# Patient Record
Sex: MALE | Race: BLACK OR AFRICAN AMERICAN | Employment: OTHER | URBAN - METROPOLITAN AREA
[De-identification: names, ages, dates, MRNs, and addresses within clinical notes are randomized per-mention and may not be internally consistent; named-entity substitution may affect disease eponyms.]

---

## 2019-11-09 ENCOUNTER — HOSPITAL ENCOUNTER (INPATIENT)
Facility: HOSPITAL | Age: 69
LOS: 9 days | Discharge: HOME HEALTH | DRG: 235 | End: 2019-11-18
Attending: INTERNAL MEDICINE | Admitting: INTERNAL MEDICINE
Payer: MEDICARE

## 2019-11-09 DIAGNOSIS — I25.119 ATHEROSCLEROSIS OF NATIVE CORONARY ARTERY OF NATIVE HEART WITH ANGINA PECTORIS (HCC): ICD-10-CM

## 2019-11-09 PROBLEM — I24.9 ACS (ACUTE CORONARY SYNDROME) (HCC): Status: ACTIVE | Noted: 2019-11-09

## 2019-11-09 PROCEDURE — 99221 1ST HOSP IP/OBS SF/LOW 40: CPT | Performed by: INTERNAL MEDICINE

## 2019-11-09 RX ORDER — CARVEDILOL 12.5 MG/1
12.5 TABLET ORAL 2 TIMES DAILY
Status: DISCONTINUED | OUTPATIENT
Start: 2019-11-09 | End: 2019-11-10

## 2019-11-09 RX ORDER — GLIPIZIDE 10 MG/1
10 TABLET ORAL
COMMUNITY

## 2019-11-09 RX ORDER — HEPARIN SODIUM 5000 [USP'U]/ML
5000 INJECTION, SOLUTION INTRAVENOUS; SUBCUTANEOUS EVERY 8 HOURS SCHEDULED
Status: DISCONTINUED | OUTPATIENT
Start: 2019-11-09 | End: 2019-11-12

## 2019-11-09 RX ORDER — AMLODIPINE BESYLATE 10 MG/1
10 TABLET ORAL DAILY
Status: DISCONTINUED | OUTPATIENT
Start: 2019-11-10 | End: 2019-11-18

## 2019-11-09 RX ORDER — ATORVASTATIN CALCIUM 40 MG/1
40 TABLET, FILM COATED ORAL DAILY
Status: DISCONTINUED | OUTPATIENT
Start: 2019-11-10 | End: 2019-11-09

## 2019-11-09 RX ORDER — ISOSORBIDE MONONITRATE 30 MG/1
30 TABLET, EXTENDED RELEASE ORAL DAILY
Status: DISCONTINUED | OUTPATIENT
Start: 2019-11-09 | End: 2019-11-11

## 2019-11-09 RX ORDER — ATORVASTATIN CALCIUM 40 MG/1
40 TABLET, FILM COATED ORAL DAILY
COMMUNITY

## 2019-11-09 RX ORDER — ATORVASTATIN CALCIUM 80 MG/1
80 TABLET, FILM COATED ORAL DAILY
Status: DISCONTINUED | OUTPATIENT
Start: 2019-11-10 | End: 2019-11-13

## 2019-11-09 RX ORDER — FLUTICASONE PROPIONATE 50 MCG
1 SPRAY, SUSPENSION (ML) NASAL DAILY
Status: DISCONTINUED | OUTPATIENT
Start: 2019-11-09 | End: 2019-11-18

## 2019-11-09 RX ORDER — CARVEDILOL 12.5 MG/1
12.5 TABLET ORAL 2 TIMES DAILY
COMMUNITY

## 2019-11-09 RX ORDER — ASPIRIN 81 MG/1
81 TABLET ORAL DAILY
Status: DISCONTINUED | OUTPATIENT
Start: 2019-11-10 | End: 2019-11-11

## 2019-11-09 RX ORDER — SODIUM CHLORIDE 0.9 % (FLUSH) 0.9 %
3 SYRINGE (ML) INJECTION AS NEEDED
Status: DISCONTINUED | OUTPATIENT
Start: 2019-11-09 | End: 2019-11-18

## 2019-11-09 RX ORDER — ACETAMINOPHEN 325 MG/1
650 TABLET ORAL EVERY 6 HOURS PRN
Status: DISCONTINUED | OUTPATIENT
Start: 2019-11-09 | End: 2019-11-12

## 2019-11-09 RX ORDER — AMLODIPINE BESYLATE 10 MG/1
10 TABLET ORAL DAILY
COMMUNITY

## 2019-11-09 NOTE — PROGRESS NOTES
Patient transferred from [facility]. Family at the bedside. Med rec completed. Tele monitoring ordered. Dr. Mcdermott Brothvika notified. Waiting for admit orders. Pt A&Ox4, /85, HR 87, 98 % RA. Pt ambulates in room independently. ACHS.  Records from [facility]

## 2019-11-10 PROCEDURE — 99233 SBSQ HOSP IP/OBS HIGH 50: CPT | Performed by: HOSPITALIST

## 2019-11-10 RX ORDER — CARVEDILOL 25 MG/1
25 TABLET ORAL 2 TIMES DAILY
Status: DISCONTINUED | OUTPATIENT
Start: 2019-11-10 | End: 2019-11-13

## 2019-11-10 RX ORDER — DEXTROSE MONOHYDRATE 25 G/50ML
50 INJECTION, SOLUTION INTRAVENOUS AS NEEDED
Status: DISCONTINUED | OUTPATIENT
Start: 2019-11-10 | End: 2019-11-18

## 2019-11-10 NOTE — PLAN OF CARE
Problem: Diabetes/Glucose Control  Goal: Glucose maintained within prescribed range  Description  INTERVENTIONS:  - Monitor Blood Glucose as ordered  - Assess for signs and symptoms of hyperglycemia and hypoglycemia  - Administer ordered medications to m and administer replacement therapy as ordered  Outcome: Progressing     Problem: Patient Centered Care  Goal: Patient preferences are identified and integrated in the patient's plan of care  Description  Interventions:  - What would you like us to know as

## 2019-11-10 NOTE — CONSULTS
[facility] HOSP - [facility]    Report of Consultation    Brayan Ortiz Patient Status:  Inpatient    1/15/1950 MRN [id number]   Location [facility] 3W/SW Attending Brunilda Andres MD   Hosp Day # 0 PCP No primary care provider on file.      Date of PRN  Fluticasone Propionate (FLONASE) 50 MCG/ACT nasal spray 1 spray, 1 spray, Each Nare, Daily      amLODIPine Besylate (NORVASC) 10 MG Oral Tab, Take 10 mg by mouth daily. aspirin 81 MG Oral Tab, Take 81 mg by mouth daily.   atorvastatin 40 MG Oral Tab, TSH, TSHREFLEX, ARUN, LIP, GGT, PSA, DDIMER, ESRML, ESRPF, CRP, BNP, MG, PHOS, TROP, CK, CKMB, ADELA, RPR, B12, ETOH, POCGLU      Imaging:     no imaging available for now      Impression/Recommendations:       Unstable angina with possible NSTEMI, I ll revie

## 2019-11-10 NOTE — CONSULTS
[facility]    Report of Consultation    Ira Brown Patient Status:  Inpatient    1/15/1950 MRN [id number]   Location [facility] 3W/SW Attending Venkat Maria MD   Hosp Day # 1 PCP No primary care provider on file.      Date of A Daily  Normal Saline Flush 0.9 % injection 3 mL, 3 mL, Intravenous, PRN  Heparin Sodium (Porcine) 5000 UNIT/ML injection 5,000 Units, 5,000 Units, Subcutaneous, Q8H [facility]  acetaminophen (TYLENOL) tab 650 mg, 650 mg, Oral, Q6H PRN  Fluticasone Propionate (SHAMEKA MCH 29.9   MCHC 33.0   RDW 13.5   NEPRELIM 4.11   WBC 7.1   .0         Recent Labs   Lab 11/10/19  0550   *   BUN 26*   CREATSERUM 1.68*   GFRAA 47*   GFRNAA 41*   CA 9.6      K 4.2      CO2 25.0        Imaging:                I

## 2019-11-10 NOTE — H&P
[address] Patient Status:  Inpatient    1/15/1950 MRN [id number]   Location [facility] 3W/SW Attending Elvis Neil MD   Hosp Day # 0 PCP No primary care provider on file.      Date:   Unknown time  Yes Yes   Sig: Take 12.5 mg by mouth 2 (two) times daily.  Pt takes 9am and 9 pm   glipiZIDE 10 MG Oral Tab 11/9/2019 at Unknown time  Yes Yes   Sig: Take 10 mg by mouth every morning before breakfast.      Facility-Administered Medications: N hypertension and diabetes presented to [facility] for an NSTEMI underwent angiogram which showed triple-vessel disease needing CABG. Patient was transferred to [facility] [place] [facility] due to insurance purposes.     CAD presented with an NSTEMI  Cardiac ca

## 2019-11-10 NOTE — PROGRESS NOTES
[facility] H[facility] - [facility]  Hospitalist Progress Note     Hal Poag Patient Status:  Inpatient    1/15/1950  78year old CSN [id number]   Location 305/305-A Attending Wes Schwartz MD   Hosp Day # 1 PCP No primary care provider on file.      ASSESSMEN 11/10/19  0550   *   BUN 26*   CREATSERUM 1.68*   GFRAA 47*   GFRNAA 41*   CA 9.6   ALB 3.3*      K 4.2      CO2 25.0   ALKPHO 57   AST 39*   ALT 43   BILT 0.4   TP 7.1     Recent Labs   Lab 11/10/19  0550   INR 1.11       • carvedilol

## 2019-11-10 NOTE — PROGRESS NOTES
Cardiology progress note    24 h event patient feels ok, CT surgery consulted.          Current Medications:  dextrose 50 % injection 50 mL, 50 mL, Intravenous, PRN  Glucose-Vitamin C (DEX-4) chewable tab 4 tablet, 4 tablet, Oral, Q15 Min PRN  glucose (DEX4 sounds normal; no masses,  no organomegaly  Extremities: extremities normal, atraumatic, no cyanosis or edema    Results:     Laboratory Data:  Lab Results   Component Value Date    WBC 7.1 11/10/2019    HGB 12.1 (L) 11/10/2019    HCT 36.7 (L) 11/10/2019 motion of the   inferior vena cava.     Left Atrium:       Mild left atrial enlargement.     Mitral Valve:      Calcified mitral annulus.                         Non rheumatic thickening of mitral valve is present.                        Normal mobility of the care of your patient. please call if you have any question.      Sanjuana Madden MD   General Cardiology & Advanced Heart Failure, Cardiac Transplant and Assisted Devices  [facility]  Pager: [phone]  Tel: [phone]

## 2019-11-10 NOTE — PROGRESS NOTES
Therapeutic interchange from Veramyst to Flonase per P&T approved protocol.      Thank you,  Radha Valencia, PharmD

## 2019-11-10 NOTE — PROGRESS NOTES
Dr Rosenbaum Morning made aware of patient  Will see tomorrow   Please obtain [facility] cath films

## 2019-11-11 ENCOUNTER — APPOINTMENT (OUTPATIENT)
Dept: CT IMAGING | Facility: HOSPITAL | Age: 69
DRG: 235 | End: 2019-11-11
Attending: CLINICAL NURSE SPECIALIST
Payer: MEDICARE

## 2019-11-11 ENCOUNTER — ANESTHESIA EVENT (OUTPATIENT)
Dept: SURGERY | Facility: HOSPITAL | Age: 69
DRG: 235 | End: 2019-11-11
Payer: MEDICARE

## 2019-11-11 ENCOUNTER — APPOINTMENT (OUTPATIENT)
Dept: ULTRASOUND IMAGING | Facility: HOSPITAL | Age: 69
DRG: 235 | End: 2019-11-11
Attending: THORACIC SURGERY (CARDIOTHORACIC VASCULAR SURGERY)
Payer: MEDICARE

## 2019-11-11 ENCOUNTER — APPOINTMENT (OUTPATIENT)
Dept: CV DIAGNOSTICS | Facility: HOSPITAL | Age: 69
DRG: 235 | End: 2019-11-11
Attending: HOSPITALIST
Payer: MEDICARE

## 2019-11-11 ENCOUNTER — APPOINTMENT (OUTPATIENT)
Dept: GENERAL RADIOLOGY | Facility: HOSPITAL | Age: 69
DRG: 235 | End: 2019-11-11
Attending: THORACIC SURGERY (CARDIOTHORACIC VASCULAR SURGERY)
Payer: MEDICARE

## 2019-11-11 ENCOUNTER — APPOINTMENT (OUTPATIENT)
Dept: ULTRASOUND IMAGING | Facility: HOSPITAL | Age: 69
DRG: 235 | End: 2019-11-11
Attending: INTERNAL MEDICINE
Payer: MEDICARE

## 2019-11-11 PROCEDURE — 71250 CT THORAX DX C-: CPT | Performed by: CLINICAL NURSE SPECIALIST

## 2019-11-11 PROCEDURE — 99232 SBSQ HOSP IP/OBS MODERATE 35: CPT | Performed by: HOSPITALIST

## 2019-11-11 PROCEDURE — 93880 EXTRACRANIAL BILAT STUDY: CPT | Performed by: THORACIC SURGERY (CARDIOTHORACIC VASCULAR SURGERY)

## 2019-11-11 PROCEDURE — 76770 US EXAM ABDO BACK WALL COMP: CPT | Performed by: INTERNAL MEDICINE

## 2019-11-11 PROCEDURE — 93306 TTE W/DOPPLER COMPLETE: CPT | Performed by: HOSPITALIST

## 2019-11-11 PROCEDURE — 71046 X-RAY EXAM CHEST 2 VIEWS: CPT | Performed by: THORACIC SURGERY (CARDIOTHORACIC VASCULAR SURGERY)

## 2019-11-11 RX ORDER — LORAZEPAM 1 MG/1
1 TABLET ORAL ONCE
Status: COMPLETED | OUTPATIENT
Start: 2019-11-11 | End: 2019-11-12

## 2019-11-11 RX ORDER — SODIUM CHLORIDE 9 MG/ML
83 INJECTION, SOLUTION INTRAVENOUS CONTINUOUS
Status: DISCONTINUED | OUTPATIENT
Start: 2019-11-12 | End: 2019-11-14

## 2019-11-11 RX ORDER — ASCORBIC ACID 500 MG
1000 TABLET ORAL ONCE
Status: COMPLETED | OUTPATIENT
Start: 2019-11-11 | End: 2019-11-12

## 2019-11-11 RX ORDER — CEFAZOLIN SODIUM/WATER 2 G/20 ML
2 SYRINGE (ML) INTRAVENOUS
Status: COMPLETED | OUTPATIENT
Start: 2019-11-12 | End: 2019-11-12

## 2019-11-11 RX ORDER — MAGNESIUM OXIDE 400 MG (241.3 MG MAGNESIUM) TABLET
3 TABLET NIGHTLY PRN
Status: DISCONTINUED | OUTPATIENT
Start: 2019-11-11 | End: 2019-11-12

## 2019-11-11 NOTE — PROGRESS NOTES
Cardiology progress note    24 h event patient feels ok, CT surgery consulted.          Current Medications:  dextrose 50 % injection 50 mL, 50 mL, Intravenous, PRN  Glucose-Vitamin C (DEX-4) chewable tab 4 tablet, 4 tablet, Oral, Q15 Min PRN  glucose (DEX4 no masses,  no organomegaly  Extremities: extremities normal, atraumatic, no cyanosis or edema    Results:     Laboratory Data:  Lab Results   Component Value Date    WBC 8.3 11/11/2019    HGB 11.2 (L) 11/11/2019    HCT 34.8 (L) 11/11/2019    .0 11/ Left Ventricle:    Normal left ventricular size.                        Mild left ventricular hypertrophy.                        Mild Reduction in left ventricular ejection fraction is   present.                        Hypokinesis of anterolateral wall, a ischemia  Impression/Recommendations:       Unstable angina with possible NSTEMI, I ll review his records from [facility] in AM  - ASA 81  - increase Lipitor to 80  - CMP, lipid profile  - repeat echo   - CT surgery consult in AM  - continue BB and CCB  - AC

## 2019-11-11 NOTE — ANESTHESIA PREPROCEDURE EVALUATION
Anesthesia PreOp Note    HPI:     Hector Wilson is a 78year old male who presents for preoperative consultation requested by: Yesy Romeo MD    Date of Surgery: 11/12/2019    Procedure(s):   HEART CORONARY ARTERY BYPASS GRAFT  Indication: Atheroscle APRN  metoprolol Tartrate (LOPRESSOR) tab 25 mg, 25 mg, Oral, Daily, oLr Gonzalez APRN  dextrose 50 % injection 50 mL, 50 mL, Intravenous, PRN, Vinayak Hdz MD  Glucose-Vitamin C (DEX-4) chewable tab 4 tablet, 4 tablet, Oral, Q15 Min PRN, Gwendolyn Mustafa I use: Yes        Frequency: 2-4 times a month        Drinks per session: 1 or 2        Binge frequency: Never      Drug use: Never      Sexual activity: Not on file    Lifestyle      Physical activity:        Days per week: Not on file        Minutes per se (36.7 °C)   TempSrc: Oral      SpO2: 97%  97% 99%   Weight:  80.8 kg (178 lb 1.6 oz)     Height:            Anesthesia Evaluation     Patient summary reviewed and Nursing notes reviewed    Airway   Mallampati: II  TM distance: >3 FB  Neck ROM: full  Dental

## 2019-11-11 NOTE — CM/SW NOTE
02:00PM  SW self-referred pt for home eval. Per RN rounds - pt to have a CABG pending surgery schedule and cardiology. SW met w/ pt and pt's wife in the room. Pt reports living w/ his wife in a single level home that has 3 stairs to get inside.     Pt re

## 2019-11-11 NOTE — HOME CARE LIAISON
Received referral from [address] [place] [address]. Met with patient at the bedside to confirm insurance information. Patient is agreeable to home health services after discharge. However, [facility] is not contracted with patient's insurance and unable to accept.  Adriane Silverman

## 2019-11-11 NOTE — PROGRESS NOTES
[facility]    Progress Note    Jennifer Diaz Patient Status:  Inpatient    1/15/1950 MRN [id number]   Location [facility] 3W/SW Attending Aminah Palomo MD   Hosp Day # 2 PCP No primary care provider on file.        Subjective:   Br T4F 0.9 11/10/2019    TSH 5.860 (H) 11/10/2019    MG 2.2 11/10/2019    PHOS 2.9 11/11/2019       Us Carotid Doppler Bilat - Diag Img (cpt=93880)    Result Date: 11/11/2019  CONCLUSION:  1.  No hemodynamically significant stenosis in the left or right intern

## 2019-11-11 NOTE — PROGRESS NOTES
Misc. Note    Pt. Is scheduled for CABG with Dr. Enid Guzman tomorrow. Written and verbal info provided to pt. And his wife, at bedside, regarding miladis-op expectations with all questions answered. STS risk score reviewed with them. Consents obtained. Pt.  Has n

## 2019-11-11 NOTE — PROGRESS NOTES
Patient's demeanor was calm and accepting. Prayer offered for surgery was appreciated by patient.  No further needs at this time     11/11/19 9339   Clinical Encounter Type   Visited With Patient   Routine Visit Introduction   Surgical Visit Pre-op   Patien

## 2019-11-11 NOTE — PROGRESS NOTES
[place] [facility] H[facility] - [facility]  Hospitalist Progress Note     Dianemonie Tavarezs Patient Status:  Inpatient    1/15/1950  78year old CSN [id number]   Location 305/305-A Attending Judy Paredes MD   Hosp Day # 2 PCP No primary care provider on file.      KATHY MCHC 33.0 32.2   RDW 13.5 13.5   NEPRELIM 4.11 5.13   WBC 7.1 8.3   .0 226.0     Recent Labs   Lab 11/10/19  0550 11/11/19  0544   * 190*   BUN 26* 33*   CREATSERUM 1.68* 1.88*   GFRAA 47* 41*   GFRNAA 41* 35*   CA 9.6 9.6   ALB 3.3* 3.1*

## 2019-11-12 ENCOUNTER — APPOINTMENT (OUTPATIENT)
Dept: GENERAL RADIOLOGY | Facility: HOSPITAL | Age: 69
DRG: 235 | End: 2019-11-12
Attending: CLINICAL NURSE SPECIALIST
Payer: MEDICARE

## 2019-11-12 ENCOUNTER — ANESTHESIA (OUTPATIENT)
Dept: SURGERY | Facility: HOSPITAL | Age: 69
DRG: 235 | End: 2019-11-12
Payer: MEDICARE

## 2019-11-12 PROCEDURE — 5A02210 ASSISTANCE WITH CARDIAC OUTPUT USING BALLOON PUMP, CONTINUOUS: ICD-10-PCS | Performed by: THORACIC SURGERY (CARDIOTHORACIC VASCULAR SURGERY)

## 2019-11-12 PROCEDURE — P9045 ALBUMIN (HUMAN), 5%, 250 ML: HCPCS | Performed by: ANESTHESIOLOGY

## 2019-11-12 PROCEDURE — B246ZZ4 ULTRASONOGRAPHY OF RIGHT AND LEFT HEART, TRANSESOPHAGEAL: ICD-10-PCS | Performed by: THORACIC SURGERY (CARDIOTHORACIC VASCULAR SURGERY)

## 2019-11-12 PROCEDURE — 93312 ECHO TRANSESOPHAGEAL: CPT | Performed by: ANESTHESIOLOGY

## 2019-11-12 PROCEDURE — 36430 TRANSFUSION BLD/BLD COMPNT: CPT | Performed by: ANESTHESIOLOGY

## 2019-11-12 PROCEDURE — 5A1223Z PERFORMANCE OF CARDIAC PACING, CONTINUOUS: ICD-10-PCS | Performed by: THORACIC SURGERY (CARDIOTHORACIC VASCULAR SURGERY)

## 2019-11-12 PROCEDURE — 02100Z9 BYPASS CORONARY ARTERY, ONE ARTERY FROM LEFT INTERNAL MAMMARY, OPEN APPROACH: ICD-10-PCS | Performed by: THORACIC SURGERY (CARDIOTHORACIC VASCULAR SURGERY)

## 2019-11-12 PROCEDURE — 021109W BYPASS CORONARY ARTERY, TWO ARTERIES FROM AORTA WITH AUTOLOGOUS VENOUS TISSUE, OPEN APPROACH: ICD-10-PCS | Performed by: THORACIC SURGERY (CARDIOTHORACIC VASCULAR SURGERY)

## 2019-11-12 PROCEDURE — 30233N1 TRANSFUSION OF NONAUTOLOGOUS RED BLOOD CELLS INTO PERIPHERAL VEIN, PERCUTANEOUS APPROACH: ICD-10-PCS | Performed by: ANESTHESIOLOGY

## 2019-11-12 PROCEDURE — 06BQ4ZZ EXCISION OF LEFT SAPHENOUS VEIN, PERCUTANEOUS ENDOSCOPIC APPROACH: ICD-10-PCS | Performed by: THORACIC SURGERY (CARDIOTHORACIC VASCULAR SURGERY)

## 2019-11-12 PROCEDURE — 5A1221Z PERFORMANCE OF CARDIAC OUTPUT, CONTINUOUS: ICD-10-PCS | Performed by: THORACIC SURGERY (CARDIOTHORACIC VASCULAR SURGERY)

## 2019-11-12 PROCEDURE — 99223 1ST HOSP IP/OBS HIGH 75: CPT | Performed by: INTERNAL MEDICINE

## 2019-11-12 PROCEDURE — 71045 X-RAY EXAM CHEST 1 VIEW: CPT | Performed by: CLINICAL NURSE SPECIALIST

## 2019-11-12 PROCEDURE — 5A1945Z RESPIRATORY VENTILATION, 24-96 CONSECUTIVE HOURS: ICD-10-PCS | Performed by: ANESTHESIOLOGY

## 2019-11-12 RX ORDER — FAMOTIDINE 10 MG/ML
20 INJECTION, SOLUTION INTRAVENOUS DAILY
Status: DISCONTINUED | OUTPATIENT
Start: 2019-11-12 | End: 2019-11-18

## 2019-11-12 RX ORDER — ALBUMIN, HUMAN INJ 5% 5 %
250 SOLUTION INTRAVENOUS ONCE AS NEEDED
Status: COMPLETED | OUTPATIENT
Start: 2019-11-12 | End: 2019-11-12

## 2019-11-12 RX ORDER — FAMOTIDINE 20 MG/1
20 TABLET ORAL DAILY
Status: DISCONTINUED | OUTPATIENT
Start: 2019-11-12 | End: 2019-11-18

## 2019-11-12 RX ORDER — SODIUM PHOSPHATE, DIBASIC AND SODIUM PHOSPHATE, MONOBASIC 7; 19 G/133ML; G/133ML
1 ENEMA RECTAL ONCE AS NEEDED
Status: DISCONTINUED | OUTPATIENT
Start: 2019-11-12 | End: 2019-11-12

## 2019-11-12 RX ORDER — ACETAMINOPHEN 325 MG/1
650 TABLET ORAL EVERY 4 HOURS PRN
Status: DISCONTINUED | OUTPATIENT
Start: 2019-11-12 | End: 2019-11-18

## 2019-11-12 RX ORDER — ALBUMIN, HUMAN INJ 5% 5 %
SOLUTION INTRAVENOUS CONTINUOUS PRN
Status: DISCONTINUED | OUTPATIENT
Start: 2019-11-12 | End: 2019-11-12 | Stop reason: SURG

## 2019-11-12 RX ORDER — SUFENTANIL CITRATE 50 UG/ML
INJECTION EPIDURAL; INTRAVENOUS AS NEEDED
Status: DISCONTINUED | OUTPATIENT
Start: 2019-11-12 | End: 2019-11-12 | Stop reason: SURG

## 2019-11-12 RX ORDER — ASCORBIC ACID 500 MG
500 TABLET ORAL 3 TIMES DAILY
Status: DISCONTINUED | OUTPATIENT
Start: 2019-11-13 | End: 2019-11-18

## 2019-11-12 RX ORDER — ROCURONIUM BROMIDE 10 MG/ML
INJECTION, SOLUTION INTRAVENOUS AS NEEDED
Status: DISCONTINUED | OUTPATIENT
Start: 2019-11-12 | End: 2019-11-12 | Stop reason: SURG

## 2019-11-12 RX ORDER — HYDROCODONE BITARTRATE AND ACETAMINOPHEN 5; 325 MG/1; MG/1
2 TABLET ORAL EVERY 4 HOURS PRN
Status: DISCONTINUED | OUTPATIENT
Start: 2019-11-12 | End: 2019-11-18

## 2019-11-12 RX ORDER — ONDANSETRON 2 MG/ML
INJECTION INTRAMUSCULAR; INTRAVENOUS AS NEEDED
Status: DISCONTINUED | OUTPATIENT
Start: 2019-11-12 | End: 2019-11-12 | Stop reason: SURG

## 2019-11-12 RX ORDER — BISACODYL 10 MG
10 SUPPOSITORY, RECTAL RECTAL
Status: DISCONTINUED | OUTPATIENT
Start: 2019-11-12 | End: 2019-11-12

## 2019-11-12 RX ORDER — MAGNESIUM OXIDE 400 MG (241.3 MG MAGNESIUM) TABLET
3 TABLET NIGHTLY PRN
Status: DISCONTINUED | OUTPATIENT
Start: 2019-11-13 | End: 2019-11-18

## 2019-11-12 RX ORDER — CEFAZOLIN SODIUM/WATER 2 G/20 ML
2 SYRINGE (ML) INTRAVENOUS EVERY 8 HOURS
Status: COMPLETED | OUTPATIENT
Start: 2019-11-12 | End: 2019-11-13

## 2019-11-12 RX ORDER — CHLORHEXIDINE GLUCONATE 0.12 MG/ML
15 RINSE ORAL 2 TIMES DAILY
Status: DISCONTINUED | OUTPATIENT
Start: 2019-11-12 | End: 2019-11-12

## 2019-11-12 RX ORDER — DEXTROSE AND SODIUM CHLORIDE 5; .9 G/100ML; G/100ML
INJECTION, SOLUTION INTRAVENOUS CONTINUOUS
Status: DISCONTINUED | OUTPATIENT
Start: 2019-11-12 | End: 2019-11-18

## 2019-11-12 RX ORDER — DOCUSATE SODIUM 100 MG/1
100 CAPSULE, LIQUID FILLED ORAL 2 TIMES DAILY
Status: DISCONTINUED | OUTPATIENT
Start: 2019-11-13 | End: 2019-11-18

## 2019-11-12 RX ORDER — NITROGLYCERIN 20 MG/100ML
INJECTION INTRAVENOUS CONTINUOUS PRN
Status: DISCONTINUED | OUTPATIENT
Start: 2019-11-12 | End: 2019-11-18

## 2019-11-12 RX ORDER — BISACODYL 10 MG
10 SUPPOSITORY, RECTAL RECTAL
Status: DISCONTINUED | OUTPATIENT
Start: 2019-11-12 | End: 2019-11-14

## 2019-11-12 RX ORDER — CHLORHEXIDINE GLUCONATE 0.12 MG/ML
15 RINSE ORAL
Status: DISCONTINUED | OUTPATIENT
Start: 2019-11-12 | End: 2019-11-13

## 2019-11-12 RX ORDER — SODIUM PHOSPHATE, DIBASIC AND SODIUM PHOSPHATE, MONOBASIC 7; 19 G/133ML; G/133ML
1 ENEMA RECTAL ONCE AS NEEDED
Status: DISCONTINUED | OUTPATIENT
Start: 2019-11-12 | End: 2019-11-13

## 2019-11-12 RX ORDER — HEPARIN SODIUM 1000 [USP'U]/ML
INJECTION, SOLUTION INTRAVENOUS; SUBCUTANEOUS AS NEEDED
Status: DISCONTINUED | OUTPATIENT
Start: 2019-11-12 | End: 2019-11-12 | Stop reason: HOSPADM

## 2019-11-12 RX ORDER — PROTAMINE SULFATE 10 MG/ML
INJECTION, SOLUTION INTRAVENOUS AS NEEDED
Status: DISCONTINUED | OUTPATIENT
Start: 2019-11-12 | End: 2019-11-12 | Stop reason: SURG

## 2019-11-12 RX ORDER — HYDROCODONE BITARTRATE AND ACETAMINOPHEN 5; 325 MG/1; MG/1
1 TABLET ORAL EVERY 4 HOURS PRN
Status: DISCONTINUED | OUTPATIENT
Start: 2019-11-12 | End: 2019-11-18

## 2019-11-12 RX ORDER — ACETAMINOPHEN 650 MG/1
650 SUPPOSITORY RECTAL EVERY 6 HOURS PRN
Status: DISCONTINUED | OUTPATIENT
Start: 2019-11-12 | End: 2019-11-18

## 2019-11-12 RX ORDER — ONDANSETRON 2 MG/ML
4 INJECTION INTRAMUSCULAR; INTRAVENOUS EVERY 6 HOURS PRN
Status: DISCONTINUED | OUTPATIENT
Start: 2019-11-12 | End: 2019-11-18

## 2019-11-12 RX ORDER — ACETAMINOPHEN 10 MG/ML
1000 INJECTION, SOLUTION INTRAVENOUS EVERY 8 HOURS
Status: COMPLETED | OUTPATIENT
Start: 2019-11-12 | End: 2019-11-13

## 2019-11-12 RX ORDER — POLYETHYLENE GLYCOL 3350 17 G/17G
17 POWDER, FOR SOLUTION ORAL DAILY PRN
Status: DISCONTINUED | OUTPATIENT
Start: 2019-11-12 | End: 2019-11-12

## 2019-11-12 RX ORDER — METOCLOPRAMIDE HYDROCHLORIDE 5 MG/ML
10 INJECTION INTRAMUSCULAR; INTRAVENOUS EVERY 6 HOURS
Status: DISCONTINUED | OUTPATIENT
Start: 2019-11-12 | End: 2019-11-12

## 2019-11-12 RX ORDER — POTASSIUM CHLORIDE 29.8 MG/ML
40 INJECTION INTRAVENOUS AS NEEDED
Status: DISCONTINUED | OUTPATIENT
Start: 2019-11-12 | End: 2019-11-18

## 2019-11-12 RX ORDER — CLOPIDOGREL BISULFATE 75 MG/1
75 TABLET ORAL DAILY
Status: DISCONTINUED | OUTPATIENT
Start: 2019-11-13 | End: 2019-11-18

## 2019-11-12 RX ORDER — MORPHINE SULFATE 2 MG/ML
2 INJECTION, SOLUTION INTRAMUSCULAR; INTRAVENOUS EVERY 2 HOUR PRN
Status: DISCONTINUED | OUTPATIENT
Start: 2019-11-12 | End: 2019-11-18

## 2019-11-12 RX ORDER — DOXEPIN HYDROCHLORIDE 50 MG/1
1 CAPSULE ORAL DAILY
Status: DISCONTINUED | OUTPATIENT
Start: 2019-11-13 | End: 2019-11-18

## 2019-11-12 RX ORDER — SENNOSIDES 8.6 MG
8.6 TABLET ORAL 2 TIMES DAILY
Status: DISCONTINUED | OUTPATIENT
Start: 2019-11-13 | End: 2019-11-18

## 2019-11-12 RX ORDER — MAGNESIUM SULFATE 1 G/100ML
1 INJECTION INTRAVENOUS AS NEEDED
Status: DISCONTINUED | OUTPATIENT
Start: 2019-11-12 | End: 2019-11-18

## 2019-11-12 RX ORDER — GLYCOPYRROLATE 0.2 MG/ML
INJECTION, SOLUTION INTRAMUSCULAR; INTRAVENOUS AS NEEDED
Status: DISCONTINUED | OUTPATIENT
Start: 2019-11-12 | End: 2019-11-12 | Stop reason: SURG

## 2019-11-12 RX ORDER — MILRINONE LACTATE 0.2 MG/ML
0.38 INJECTION, SOLUTION INTRAVENOUS AS NEEDED
Status: DISCONTINUED | OUTPATIENT
Start: 2019-11-12 | End: 2019-11-18

## 2019-11-12 RX ORDER — AMIODARONE HYDROCHLORIDE 200 MG/1
200 TABLET ORAL
Status: DISCONTINUED | OUTPATIENT
Start: 2019-11-12 | End: 2019-11-18

## 2019-11-12 RX ORDER — ASPIRIN 81 MG/1
81 TABLET ORAL DAILY
Status: DISCONTINUED | OUTPATIENT
Start: 2019-11-13 | End: 2019-11-18

## 2019-11-12 RX ORDER — POLYETHYLENE GLYCOL 3350 17 G/17G
17 POWDER, FOR SOLUTION ORAL DAILY PRN
Status: DISCONTINUED | OUTPATIENT
Start: 2019-11-12 | End: 2019-11-14

## 2019-11-12 RX ORDER — POTASSIUM CHLORIDE 14.9 MG/ML
20 INJECTION INTRAVENOUS AS NEEDED
Status: DISCONTINUED | OUTPATIENT
Start: 2019-11-12 | End: 2019-11-18

## 2019-11-12 RX ORDER — DOBUTAMINE HYDROCHLORIDE 200 MG/100ML
INJECTION INTRAVENOUS CONTINUOUS PRN
Status: DISCONTINUED | OUTPATIENT
Start: 2019-11-12 | End: 2019-11-18

## 2019-11-12 RX ORDER — HEPARIN SODIUM 5000 [USP'U]/ML
5000 INJECTION, SOLUTION INTRAVENOUS; SUBCUTANEOUS EVERY 12 HOURS SCHEDULED
Status: DISCONTINUED | OUTPATIENT
Start: 2019-11-13 | End: 2019-11-12

## 2019-11-12 RX ORDER — DOBUTAMINE HYDROCHLORIDE 100 MG/100ML
INJECTION INTRAVENOUS CONTINUOUS PRN
Status: DISCONTINUED | OUTPATIENT
Start: 2019-11-12 | End: 2019-11-12 | Stop reason: SURG

## 2019-11-12 RX ORDER — MAGNESIUM SULFATE HEPTAHYDRATE 40 MG/ML
2 INJECTION, SOLUTION INTRAVENOUS AS NEEDED
Status: DISCONTINUED | OUTPATIENT
Start: 2019-11-12 | End: 2019-11-18

## 2019-11-12 RX ORDER — HEPARIN SODIUM AND DEXTROSE 10000; 5 [USP'U]/100ML; G/100ML
500 INJECTION INTRAVENOUS CONTINUOUS PRN
Status: DISCONTINUED | OUTPATIENT
Start: 2019-11-12 | End: 2019-11-14

## 2019-11-12 RX ORDER — GLYCOPYRROLATE 0.2 MG/ML
0.6 INJECTION, SOLUTION INTRAMUSCULAR; INTRAVENOUS ONCE
Status: COMPLETED | OUTPATIENT
Start: 2019-11-12 | End: 2019-11-12

## 2019-11-12 RX ORDER — ASPIRIN 81 MG/1
81 TABLET, CHEWABLE ORAL ONCE
Status: COMPLETED | OUTPATIENT
Start: 2019-11-12 | End: 2019-11-12

## 2019-11-12 RX ORDER — NEOSTIGMINE METHYLSULFATE 0.5 MG/ML
3 INJECTION INTRAVENOUS ONCE
Status: COMPLETED | OUTPATIENT
Start: 2019-11-12 | End: 2019-11-12

## 2019-11-12 RX ORDER — ACETAMINOPHEN 325 MG/1
650 TABLET ORAL EVERY 6 HOURS PRN
Status: DISCONTINUED | OUTPATIENT
Start: 2019-11-12 | End: 2019-11-18

## 2019-11-12 RX ORDER — METOCLOPRAMIDE HYDROCHLORIDE 5 MG/ML
5 INJECTION INTRAMUSCULAR; INTRAVENOUS EVERY 6 HOURS
Status: DISCONTINUED | OUTPATIENT
Start: 2019-11-12 | End: 2019-11-14

## 2019-11-12 RX ORDER — MORPHINE SULFATE 4 MG/ML
4 INJECTION, SOLUTION INTRAMUSCULAR; INTRAVENOUS EVERY 2 HOUR PRN
Status: DISCONTINUED | OUTPATIENT
Start: 2019-11-12 | End: 2019-11-18

## 2019-11-12 RX ORDER — MORPHINE SULFATE 4 MG/ML
8 INJECTION, SOLUTION INTRAMUSCULAR; INTRAVENOUS EVERY 2 HOUR PRN
Status: DISCONTINUED | OUTPATIENT
Start: 2019-11-12 | End: 2019-11-18

## 2019-11-12 RX ORDER — CLOPIDOGREL BISULFATE 75 MG/1
75 TABLET ORAL ONCE
Status: COMPLETED | OUTPATIENT
Start: 2019-11-12 | End: 2019-11-12

## 2019-11-12 RX ORDER — ACETAMINOPHEN 160 MG/5ML
650 SOLUTION ORAL EVERY 6 HOURS PRN
Status: DISCONTINUED | OUTPATIENT
Start: 2019-11-12 | End: 2019-11-18

## 2019-11-12 RX ADMIN — GLYCOPYRROLATE 0.2 MG: 0.2 INJECTION, SOLUTION INTRAMUSCULAR; INTRAVENOUS at 07:10:00

## 2019-11-12 RX ADMIN — DOBUTAMINE HYDROCHLORIDE 7 MCG/KG/MIN: 100 INJECTION INTRAVENOUS at 11:09:00

## 2019-11-12 RX ADMIN — SUFENTANIL CITRATE 100 MCG: 50 INJECTION EPIDURAL; INTRAVENOUS at 07:10:00

## 2019-11-12 RX ADMIN — ALBUMIN, HUMAN INJ 5%: 5 SOLUTION INTRAVENOUS at 09:11:00

## 2019-11-12 RX ADMIN — DOBUTAMINE HYDROCHLORIDE 10 MCG/KG/MIN: 100 INJECTION INTRAVENOUS at 11:19:00

## 2019-11-12 RX ADMIN — SODIUM CHLORIDE: 9 INJECTION, SOLUTION INTRAVENOUS at 07:01:00

## 2019-11-12 RX ADMIN — SUFENTANIL CITRATE 50 MCG: 50 INJECTION EPIDURAL; INTRAVENOUS at 12:10:00

## 2019-11-12 RX ADMIN — SODIUM CHLORIDE: 9 INJECTION, SOLUTION INTRAVENOUS at 13:20:00

## 2019-11-12 RX ADMIN — ROCURONIUM BROMIDE 100 MG: 10 INJECTION, SOLUTION INTRAVENOUS at 07:10:00

## 2019-11-12 RX ADMIN — PROTAMINE SULFATE 400 MG: 10 INJECTION, SOLUTION INTRAVENOUS at 11:59:00

## 2019-11-12 RX ADMIN — ONDANSETRON 4 MG: 2 INJECTION INTRAMUSCULAR; INTRAVENOUS at 07:10:00

## 2019-11-12 RX ADMIN — ALBUMIN, HUMAN INJ 5%: 5 SOLUTION INTRAVENOUS at 08:00:00

## 2019-11-12 RX ADMIN — CEFAZOLIN SODIUM/WATER 2 G: 2 G/20 ML SYRINGE (ML) INTRAVENOUS at 07:30:00

## 2019-11-12 NOTE — PROGRESS NOTES
[facility] Pharmacy Note:  Renal Dose Adjustment    Wyatt Wayne has been prescribed famotidine (PEPCID) 20 mg intravenously and orally every 12 hours. Estimated Creatinine Clearance: 37.6 mL/min (A) (based on SCr of 1.89 mg/dL (H)).     His calculated creatini

## 2019-11-12 NOTE — PAYOR COMM NOTE
--------------  ADMISSION REVIEW     Payor: MEDICARE ADVANTAGE [facility]  Subscriber #:  [id number]  Authorization Number: [id number]    Admit date: 11/9/19  Admit time: 26       Admitting Physician: Patrick Sullivan MD  Attending Physician:  Sabine Ortega reports that he has never smoked. He has never used smokeless tobacco. He reports current alcohol use. He reports that he does not use drugs.     Allergies:  Not on File    Home Medications:  Prior to Admission Medications   Prescriptions Last Dose Informa Respiratory:  Lungs are clear to auscultation, respirations are non-labored, breath sounds are equal, symmetrical chest wall expansion. Cardiovascular:  Normal rate, regular rhythm, no murmur, no edema.   Gastrointestinal:  Soft, non-tender, non-distended, Blood pressure (!) 165/74, pulse 80, temperature 97.6 °F (36.4 °C), temperature source Oral, resp. rate 16, height 177.8 cm (5' 10\"), weight 178 lb 3.2 oz, SpO2 98 %.     Impression/Recommendations:        Unstable angina with possible NSTEMI, I ll review admitted to the hospital for consideration for coronary bypass grafting. The patient was admitted to [facility] with chest pain and dyspnea on exertion.   Cardiac catheterization there showed severe triple-vessel disease with bypass targets includin Result Date: 11/11/2019  CONCLUSION:  1. No hemodynamically significant stenosis in the left or right internal or common carotid arteries.   There is atherosclerosis within the external carotid arteries (ECAs) resulting in velocity elevations that correspon aminocaproic acid (AMICAR) bolus from bag   Dose: 5 g  Freq: Once Route: IV  Start: 11/11/19 1955 End: 11/12/19 4310         And  aminocaproic acid (AMICAR) 10 g in sodium chloride 0.9% 250 mL infusion   Dose: 1 g/hr  Freq:  Once Route: IV  Start: 11/11/19 Fluticasone Propionate (FLONASE) 50 MCG/ACT nasal spray 1 spray   Dose: 1 spray  Freq: Daily Route: Each Nare  Start: 11/09/19 2000    (2204)-Not Given          (0930)-Not Given          (0920)-Not Given            Heparin Sodium (Porcine) 5000 UNIT/ML inj FOR REVIEW/APPROVAL OF INPT ICU ADMISSION

## 2019-11-12 NOTE — HOME CARE LIAISON
[facility] is not contracted with patient's insurance.  [facility] is unable to accept this referral.

## 2019-11-12 NOTE — CONSULTS
[facility]    Report of Consultation    Asa Curb Patient Status:  Inpatient    1/15/1950 MRN [id number]   Location [facility] 2W/SW Attending Harry Prater MD   Hosp Day # 3 PCP No primary care provider on file.      Date Once  glycopyrrolate (ROBINUL) 0.2 MG/ML injection 0.6 mg, 0.6 mg, Intravenous, Once  fentaNYL citrate (SUBLIMAZE) 0.05 MG/ML injection 25 mcg, 25 mcg, Intravenous, Q30 Min PRN    Or  fentaNYL citrate (SUBLIMAZE) 0.05 MG/ML injection 50 mcg, 50 mcg, Damian Motta [facility]) injection 4 mg, 4 mg, Intravenous, Q6H PRN  famoTIDine (PEPCID) tab 20 mg, 20 mg, Oral, Daily    Or  famoTIDine (PEPCID) injection 20 mg, 20 mg, Intravenous, Daily  potassium chloride IVPB premix 20 mEq, 20 mEq, Intravenous, PRN    Or  potassium c TID CC  Amiodarone HCl (CORDARONE) 450 mg in dextrose 5 % 250 mL infusion, 1 mg/min, Intravenous, Continuous    Followed by  Amiodarone HCl (CORDARONE) 450 mg in dextrose 5 % 250 mL infusion, 0.5 mg/min, Intravenous, Continuous  Metoclopramide HCl (REGLAN) anicteric sclera   Neck : no JVD   Chest : good air exchange to both lungs and clear bilaterally   Heart : s1 s2 RRR no gallop or murmur   abd : soft and benign   Ext : no edema , DP+   Sedated , intubated on vent     Results:     Laboratory Data:  Lab Res 11/11/2019 at 15:35     Approved by (CST): Gaby Jackson MD on 11/11/2019 at 15:44          [facility] (cpt=93880)    Result Date: 11/11/2019  CONCLUSION:  1.  No hemodynamically significant stenosis in the left or right internal o

## 2019-11-12 NOTE — PROGRESS NOTES
[facility]    Progress Note    Ashok Nyhan Patient Status:  Inpatient    1/15/1950 MRN [id number]   Location [facility] 2W/SW Attending Mirna Tomlin MD   Hosp Day # 3 PCP No primary care provider on file.        Subjective: (cpt=71046)    Result Date: 11/11/2019  CONCLUSION:  1. There is an ovoid 1.3 x 0.6 cm nodular density in the right mid chest at the level of the anterior right 4th rib.   It is difficult to tell if this is within bone, granuloma or noncalcified pulmonary n

## 2019-11-12 NOTE — PROGRESS NOTES
[facility] Pharmacy Note:  Renal Dose Adjustment for Metoclopramide (REGLAN)    Real Sevilla has been prescribed Metoclopramide (REGLAN) 10 mg every 6 hours. Estimated Creatinine Clearance: 37.6 mL/min (A) (based on SCr of 1.89 mg/dL (H)).     His calculated cr

## 2019-11-12 NOTE — OR NURSING
PATIENTS WIFE MAY CONTACTED AT [address] AT PHONE NUMBER [phone].  SHE DID NOT ANSWER AND A VOICEMAIL WAS LEFT STATING THE PROCEDURE HAD STARTED AND SHE WOULD RECEIVE ANOTHER PHONE CALL ONE THE SURGERY WAS FINISHING UP.

## 2019-11-12 NOTE — CM/SW NOTE
Received MDO for home health evaluation. Patient is POD#0 s/p CABG. Patient was seen by Mauricio Cabrera 11/11 for assessment, patient was referred to [facility] but they are not in network.      Referral made to [address] [place] [address] via A

## 2019-11-12 NOTE — PLAN OF CARE
POD #0  Cabg x 3 with IABP placement in the OR; AET 1320  Arrived hemodynamically stable with some hypertension, requiring nitro to be started  Persistently low CO/CI throughout the afternoon, requiring milrinone to be added with a loading dose.  Requires h skin color and temperature  - Assess for signs of decreased coronary artery perfusion - ex.  Angina  - Evaluate fluid balance, assess for edema, trend weights  Outcome: Not Progressing  Goal: Absence of cardiac arrhythmias or at baseline  Description  INTER METABOLIC/FLUID AND ELECTROLYTES - ADULT  Goal: Glucose maintained within prescribed range  Description  INTERVENTIONS:  - Monitor Blood Glucose as ordered  - Assess for signs and symptoms of hyperglycemia and hypoglycemia  - Administer ordered medications Initiate Pressure Ulcer prevention bundle as indicated  Outcome: Not Progressing     Problem: HEMATOLOGIC - ADULT  Goal: Maintains hematologic stability  Description  INTERVENTIONS  - Assess for signs and symptoms of bleeding or hemorrhage  - Monitor labs

## 2019-11-12 NOTE — OPERATIVE REPORT
[facility] 2W/SW  Operative Note     [facility] Location: OR   [facility] [id number] N [id number]   Admission Date 11/9/2019 Operation Date 11/12/2019   Attending Physician Iesha Ibanez MD Operating Physician Eloise Linda MD      Preoperati patient through this. And from then on he improved with PA pressures coming down, mitral regurg getting back to baseline and left ventricular function moving well with good hemodynamics and just dobutamine.      Anesthesia: Cardiac General     Complication retrograde because of some aortic valve insufficiency. After the initial dose additional doses were given after each anastomosis. I looked at the right coronary distribution it was rockhard in the distal branches were filamentous diseased and tiny.   I th cardiopulmonary bypass with some support and difficulty. Flow was checked in all the grafts. The flows were good. EKG and BANDAR looked good.   Over the patient did not maintain pressure support 1 pack on increase the inotropic support and placed a right fem unintended errors.

## 2019-11-13 ENCOUNTER — APPOINTMENT (OUTPATIENT)
Dept: GENERAL RADIOLOGY | Facility: HOSPITAL | Age: 69
DRG: 235 | End: 2019-11-13
Attending: CLINICAL NURSE SPECIALIST
Payer: MEDICARE

## 2019-11-13 PROCEDURE — 99233 SBSQ HOSP IP/OBS HIGH 50: CPT | Performed by: INTERNAL MEDICINE

## 2019-11-13 PROCEDURE — 71045 X-RAY EXAM CHEST 1 VIEW: CPT | Performed by: CLINICAL NURSE SPECIALIST

## 2019-11-13 RX ORDER — ATORVASTATIN CALCIUM 40 MG/1
80 TABLET, FILM COATED ORAL NIGHTLY
Status: DISCONTINUED | OUTPATIENT
Start: 2019-11-13 | End: 2019-11-18

## 2019-11-13 RX ORDER — HEPARIN SODIUM 5000 [USP'U]/ML
5000 INJECTION, SOLUTION INTRAVENOUS; SUBCUTANEOUS EVERY 12 HOURS SCHEDULED
Status: DISCONTINUED | OUTPATIENT
Start: 2019-11-14 | End: 2019-11-18

## 2019-11-13 RX ORDER — NITROGLYCERIN 0.4 MG/1
0.4 TABLET SUBLINGUAL EVERY 5 MIN PRN
Status: DISCONTINUED | OUTPATIENT
Start: 2019-11-13 | End: 2019-11-18

## 2019-11-13 RX ORDER — NITROGLYCERIN 0.4 MG/1
TABLET SUBLINGUAL
Status: COMPLETED
Start: 2019-11-13 | End: 2019-11-13

## 2019-11-13 RX ORDER — CARVEDILOL 12.5 MG/1
12.5 TABLET ORAL 2 TIMES DAILY WITH MEALS
Status: DISCONTINUED | OUTPATIENT
Start: 2019-11-13 | End: 2019-11-18

## 2019-11-13 RX ORDER — CARVEDILOL 12.5 MG/1
12.5 TABLET ORAL 2 TIMES DAILY WITH MEALS
Status: DISCONTINUED | OUTPATIENT
Start: 2019-11-13 | End: 2019-11-13

## 2019-11-13 RX ORDER — ISOSORBIDE MONONITRATE 30 MG/1
30 TABLET, EXTENDED RELEASE ORAL DAILY
Status: DISCONTINUED | OUTPATIENT
Start: 2019-11-13 | End: 2019-11-17

## 2019-11-13 NOTE — PAYOR COMM NOTE
--------------  CONTINUED STAY REVIEW    Payor: MEDICARE [employer]VANT[employer] ([address] [place] [address])  Subscriber #:  [id number]  Authorization Number: [id number]    Admit date: 11/9/19  Admit time: 26    Admitting Physician: Vinayak Hdz MD  Attending Physician:  Virgilio Garza ischemia. EKG and rhythm were stable. And introverted balloon pump was placed in the right femoral artery to help support the patient through this.   And from then on he improved with PA pressures coming down, mitral regurg getting back to baseline and le aorta was crossclamped, antegrade and retrograde cardioplegia were administered. Most of the cardioplegia was given retrograde because of some aortic valve insufficiency. After the initial dose additional doses were given after each anastomosis.   I looke the aorta was unclamped while venting. After adequate rewarming and recovery the patient was weaned initially from cardiopulmonary bypass with some support and difficulty. Flow was checked in all the grafts. The flows were good. EKG and BANDAR looked good. PM    Report of Consultation Pulmonology  Reason for Consultation:   Post CABG  ICU care     Patient underwent CABG x3 today with intra-aortic balloon pump placed in OR with LVEF 45%  Patient just arrived to ICU he is intubated on mechanical ventilation wi CABG X3    POD# 0   IABP in-pace   LVEF 45 %   Acceptable HD / PAP 26/12 and SVO2 around 70 %   No bleeding or air leak from ct    Supportive care on vent ( now Fio2 50 % with acceptable lung  on the vent ) CXR clear and likely to extubate per prot 250 mL infusion     Date Action Dose Route User    11/13/2019 0600 Rate/Dose Verify 0.5 mg/min Intravenous Rachel Perez RN    11/13/2019 0102 New Bag 0.5 mg/min Intravenous Rachel Perez RN    11/12/2019 2230 Rate/Dose Verify 0.5 mg/min Intravenous mcg/kg/hr × 80.9 kg Intravenous Fernanda Mosquera RN    11/12/2019 1400 Rate/Dose Verify 1 mcg/kg/hr × 80.9 kg Intravenous Melba RAMIRES RN      dextrose 5 % and 0.9 % NaCl infusion     Date Action Dose Route User    11/13/2019 0600 Rate/Dose Verify 20 mL/ mcg/kg/min × 80.9 kg Intravenous Angelique Finn RN    11/12/2019 1600 Rate/Dose Verify 12 mcg/kg/min × 80.9 kg Intravenous Angelique Finn RN    11/12/2019 1507 Rate/Dose Change 12 mcg/kg/min × 80.9 kg Intravenous Angelique Finn RN    11/12/2019 1500 Rat Intravenous Mono Medina RN    11/13/2019 0200 Rate/Dose Verify 5 Units/hr Intravenous Mono Medina RN    11/13/2019 0102 Rate/Dose Change 5 Units/hr Intravenous Mono Medina RN    11/13/2019 0000 Rate/Dose Verify 7 Units/hr Intravenous Tatianna Blew, 11/12/2019 1600 Given Other 0.5 mcg/kg/min × 80.9 kg Intravenous Frank Avendaño RN    11/12/2019 1541 Given 0.5 mcg/kg/min × 80.9 kg Intravenous Rossi RAMIRES RN      morphINE sulfate (PF) 4 MG/ML injection 4 mg     Date Action Dose Route User    11/12/ Route User    11/12/2019 6673 Given 5 g Intravenous Robel Arias MD      bacitracin Leland Cobos) 50,000 Units in sodium chloride 0.9 % 1,000 mL irrigation     Date Action Dose Route User    11/12/2019 4286 Given (none) Irrigation Yuni Figueroa MD 11/12/2019 1550 Given 25 mEq (none) Jackie Luevano RN      sodium bicarbonate injection 100 mEq     Date Action Dose Route User    11/12/2019 1428 Given 50 mEq Intravenous Elvis Bartholomew RN      0.9% NaCl infusion     Date Action Dose Route User    11

## 2019-11-13 NOTE — DIETARY NOTE
NUTRITION:  Diet Education    Consult received for diet education per protocol. Education deferred until s/p intervention and when appropriate for teaching. Patient still intubated.  Nutrition care handout provided on what to except after cardiac proced

## 2019-11-13 NOTE — PROGRESS NOTES
Cardiology progress note  Intubated, now off sedation. Seen post op.   Critical care time 35 min    Current Medications:  fentaNYL citrate (SUBLIMAZE) 0.05 MG/ML injection 25 mcg, 25 mcg, Intravenous, Q30 Min PRN    Or  fentaNYL citrate (SUBLIMAZE) 0.05 MG/ Q6H PRN  famoTIDine (PEPCID) tab 20 mg, 20 mg, Oral, Daily    Or  famoTIDine (PEPCID) injection 20 mg, 20 mg, Intravenous, Daily  potassium chloride IVPB premix 20 mEq, 20 mEq, Intravenous, PRN    Or  potassium chloride IVPB premix 40 mEq, 40 mEq, Intraven Intravenous, Q6H  heparin (PORCINE) drip [id number]/250mL infusion, 500 Units/hr, Intravenous, Continuous PRN  [MAR Hold] Isosorbide Mononitrate ER (IMDUR) 24 hr tab 90 mg, 90 mg, Oral, Daily  sodium bicarbonate 150 mEq in dextrose 5 % 1,000 mL infusion, 0 extremities normal, atraumatic, no cyanosis or edema but no pulses  Neuro Moving all 4 ext  Results:     Laboratory Data:  Lab Results   Component Value Date    WBC 16.4 (H) 11/12/2019    HGB 9.7 (L) 11/12/2019    HCT 29.4 (L) 11/12/2019    .0 (L) 1 (pko=[id number])    Result Date: 11/11/2019  CONCLUSION:  1. No hemodynamically significant stenosis in the left or right internal or common carotid arteries.   There is atherosclerosis within the external carotid arteries (ECAs) resulting in velocity elevations ventricular ejection fraction is   present.                        Hypokinesis of anterolateral wall, anteroseptal wall                       Grade II diastolic dysfunction with elevated LAP     Right Ventricle:   Normal right ventricular size and function to 80  - CMP, lipid profile  - repeat echo   - CT surgery consult in AM  - continue BB and CCB  - ACEI based on CMP  - I ll add imdur 30    11/10/19 records reviewed, CT surgery consulted for AM, patient has 3 vessels disease, echo to be repeated, might ne

## 2019-11-13 NOTE — PROGRESS NOTES
Patient extubated to nasal cannula, tolerating well. Able to phonate, dry swallow, and cough/clear airway. Left radial arterial line discontinued. Sargents-Maria Isabel discontinued, Cordis in place -- all ports infusing properly with great blood return.  Sites clean,

## 2019-11-13 NOTE — PLAN OF CARE
-POD#1, CABG x 3, Dr. Reinoso Mess off milrinone and dobutamine drips this morning.    -Extubated, [place] removed, arterial lines removed.    -Up to chair, clear liquid diet (cardiac and diabetic, 1500 fluid restriction)    -Weaning off insulin drip t cardiac output  - Evaluate effectiveness of vasoactive medications to optimize hemodynamic stability  - Monitor arterial and/or venous puncture sites for bleeding and/or hematoma  - Assess quality of pulses, skin color and temperature  - Assess for signs o needed  - Instruct patient on self management of diabetes  Outcome: Progressing  Goal: Electrolytes maintained within normal limits  Description  INTERVENTIONS:  - Monitor labs and rhythm and assess patient for signs and symptoms of electrolyte imbalances supportive blood products/factors as ordered and appropriate  Outcome: Progressing     Problem: NEUROLOGICAL - ADULT  Goal: Achieves stable or improved neurological status  Description  INTERVENTIONS  - Assess for and report changes in neurological status

## 2019-11-13 NOTE — PROGRESS NOTES
[facility]    Progress Note    Svetlana Arnold Patient Status:  Inpatient    1/15/1950 MRN [id number]   Location [facility] 2W/SW Attending Dwight Shearer MD   Hosp Day # 4 PCP No primary care provider on file.        Subjective: fentanyl (SUBLIMAZE) infusion     • Dextrose-NaCl 20 mL/hr (11/13/19 0600)   • DOBUTamine 2 mcg/kg/min (11/13/19 0754)   • Nitroglycerin in D5W Stopped (11/12/19 1710)   • norepinephrine Stopped (11/12/19 2200)   • nitroprusside (NIPRIDE) 50 mg in D5W infu today; urine output adequate; nephrology following  Hemodynamically stable dobutamine weaned off  Expected post-op anemia hbg 8.9 today; IV venofer and epoetin per nephrology  At risk for post-op afib on amio protocol and BB would stop amio at discharge if pulmonary congestion. Patchy right lung base atelectasis. No pneumothorax.     Dictated by (CST): Fan Peres MD on 11/13/2019 at 7:04     Approved by (CST): Fan Peres MD on 11/13/2019 at 7:05          Xr Chest Ap Portable  (cpt=71045)    Result D

## 2019-11-13 NOTE — PROGRESS NOTES
[facility]    Progress Note    Filiberto Morales Patient Status:  Inpatient    1/15/1950 MRN [id number]   Location [facility] 2W/SW Attending Yasmin George MD   Hosp Day # 4 PCP No primary care provider on file.         Subjecti 26.4 (L) 11/13/2019    .0 (L) 11/13/2019    CREATSERUM 2.51 (H) 11/13/2019    BUN 33 (H) 11/13/2019     11/13/2019    K 4.3 11/13/2019     11/13/2019    CO2 25.0 11/13/2019     (H) 11/13/2019    CA 8.2 (L) 11/13/2019    ALB 3.1 (L by (CST): Shreya Jaimes MD on 11/12/2019 at 14:02          Ekg 12-lead    Result Date: 11/13/2019  ECG Report  Interpretation  --------------------------     Ekg 12-lead    Result Date: 11/12/2019  ECG Report  Interpretation  --------------------------

## 2019-11-13 NOTE — PLAN OF CARE
Problem: Diabetes/Glucose Control  Goal: Glucose maintained within prescribed range  Description  INTERVENTIONS:  - Monitor Blood Glucose as ordered  - Assess for signs and symptoms of hyperglycemia and hypoglycemia  - Administer ordered medications to m and administer replacement therapy as ordered  Outcome: Progressing     Problem: Patient Centered Care  Goal: Patient preferences are identified and integrated in the patient's plan of care  Description  Interventions:  - What would you like us to know as normal limits  Description  INTERVENTIONS:  - Monitor labs and rhythm and assess patient for signs and symptoms of electrolyte imbalances  - Administer electrolyte replacement as ordered  - Monitor response to electrolyte replacements, including rhythm and Achieves stable or improved neurological status  Description  INTERVENTIONS  - Assess for and report changes in neurological status  - Initiate measures to prevent increased intracranial pressure  - Maintain blood pressure and fluid volume within ordered p

## 2019-11-13 NOTE — PROGRESS NOTES
[facility]    Progress Note    Enrique Hollis Patient Status:  Inpatient    1/15/1950 MRN [id number]   Location [facility] 2W/SW Attending Lashay Avina MD   Hosp Day # 4 PCP No primary care provider on file.        Subjective: MG 2.9 (H) 11/13/2019    PHOS 2.6 11/12/2019       Ct Chest (zrs=[id number])    Result Date: 11/11/2019  CONCLUSION:  1. No suspicious pulmonary nodule.   Findings on chest radiograph are likely secondary to linear calcification of the right 4th rib costochon

## 2019-11-14 ENCOUNTER — APPOINTMENT (OUTPATIENT)
Dept: GENERAL RADIOLOGY | Facility: HOSPITAL | Age: 69
DRG: 235 | End: 2019-11-14
Attending: NURSE PRACTITIONER
Payer: MEDICARE

## 2019-11-14 PROCEDURE — 74018 RADEX ABDOMEN 1 VIEW: CPT | Performed by: NURSE PRACTITIONER

## 2019-11-14 PROCEDURE — 99233 SBSQ HOSP IP/OBS HIGH 50: CPT | Performed by: INTERNAL MEDICINE

## 2019-11-14 PROCEDURE — 99233 SBSQ HOSP IP/OBS HIGH 50: CPT | Performed by: HOSPITALIST

## 2019-11-14 RX ORDER — POLYETHYLENE GLYCOL 3350 17 G/17G
17 POWDER, FOR SOLUTION ORAL DAILY
Status: DISCONTINUED | OUTPATIENT
Start: 2019-11-14 | End: 2019-11-18

## 2019-11-14 RX ORDER — BUMETANIDE 0.25 MG/ML
1 INJECTION, SOLUTION INTRAMUSCULAR; INTRAVENOUS ONCE
Status: COMPLETED | OUTPATIENT
Start: 2019-11-14 | End: 2019-11-14

## 2019-11-14 RX ORDER — CLOPIDOGREL BISULFATE 75 MG/1
300 TABLET ORAL ONCE
Status: COMPLETED | OUTPATIENT
Start: 2019-11-14 | End: 2019-11-14

## 2019-11-14 RX ORDER — BISACODYL 10 MG
10 SUPPOSITORY, RECTAL RECTAL
Status: DISCONTINUED | OUTPATIENT
Start: 2019-11-14 | End: 2019-11-18

## 2019-11-14 RX ORDER — BISACODYL 10 MG
10 SUPPOSITORY, RECTAL RECTAL DAILY
Status: DISCONTINUED | OUTPATIENT
Start: 2019-11-14 | End: 2019-11-18

## 2019-11-14 NOTE — PHYSICAL THERAPY NOTE
PHYSICAL THERAPY EVALUATION - INPATIENT     Room Number: 230/230-A  Evaluation Date: 11/14/2019  Type of Evaluation: Initial   Physician Order: PT Eval and Treat    Presenting Problem: urgent CABG x 3 on 11/12  Reason for Therapy: Mobility Dysfunction and Patient was found to have mildly elevated troponin followed by elevated BNP underwent echo which showed EF of 45% with anterior wall regional wall motion abnormality.   Patient underwent radial angiogram which showed severe triple-vessel disease needing CAB bedclothes, sheets and blankets)?: A Little   -   Sitting down on and standing up from a chair with arms (e.g., wheelchair, bedside commode, etc.): A Little   -   Moving from lying on back to sitting on the side of the bed?: A Little   How much help from a discharge.    Goal #5   Current Status    Goal #6    Goal #6  Current Status

## 2019-11-14 NOTE — PROGRESS NOTES
Cardiology progress note  Extubated IABP is out. Had some L sided cp. DW Dr Bob Martin, RN. CCT 40 min.     Current Medications:  epoetin micah-epbx (RETACRIT) 25479 UNIT/ML injection 10,000 Units, 10,000 Units, Intravenous, Weekly  iron sucrose (VENOFER) IV Pu PRN  bisacodyl (DULCOLAX) rectal suppository 10 mg, 10 mg, Rectal, Daily PRN  ondansetron HCl (ZOFRAN) injection 4 mg, 4 mg, Intravenous, Q6H PRN  famoTIDine (PEPCID) tab 20 mg, 20 mg, Oral, Daily    Or  famoTIDine (PEPCID) injection 20 mg, 20 mg, Intraven mL/kg/hr, Intravenous, Continuous  0.9% NaCl infusion, 83 mL/hr, Intravenous, Continuous  [MAR Hold] dextrose 50 % injection 50 mL, 50 mL, Intravenous, PRN  [MAR Hold] Glucose-Vitamin C (DEX-4) chewable tab 4 tablet, 4 tablet, Oral, Q15 Min PRN  [MAR Hold] CO2 25.0 11/13/2019     (H) 11/13/2019    CA 8.2 (L) 11/13/2019    ALB 3.1 (L) 11/12/2019    ALKPHO 57 11/10/2019    TP 7.1 11/10/2019    AST 39 (H) 11/10/2019    ALT 43 11/10/2019    PTT 41.5 (H) 11/12/2019    INR 1.81 (H) 11/12/2019    PTP 21. 1 marginal 1: The vessel was  medium sized (co-dominant).     Echo 11/6/19   LV Ejection Fraction:  45    %    Left Ventricle:    Normal left ventricular size.                        Mild left ventricular hypertrophy.                        Mild Reduction in anterior concave ST elevation likely secondary repolarization and inferior lateral ST depression likely from ischemia  Impression/Recommendations:       Unstable angina with possible NSTEMI, I ll review his records from [place] in AM  - CAD s/p CABG LIMA

## 2019-11-14 NOTE — PLAN OF CARE
-POD#2 CABG x 3, Dr. Geremias Rogers. Chest tubes and Cordis discontinued today. Ambulatory with one assist/walker for stability PRN, appetite fair, had small/hard brown bowel movement this evening. Patient ambulated five times today in PCCU.     -Patient BGs consi stability  Description  INTERVENTIONS:  - Monitor vital signs, rhythm, and trends  - Monitor for bleeding, hypotension and signs of decreased cardiac output  - Evaluate effectiveness of vasoactive medications to optimize hemodynamic stability  - Monitor ar medications to maintain glucose within target range  - Assess barriers to adequate nutritional intake and initiate nutrition consult as needed  - Instruct patient on self management of diabetes  Outcome: Progressing  Goal: Electrolytes maintained within no signs for trends  - Administer supportive blood products/factors, fluids and medications as ordered and appropriate  - Administer supportive blood products/factors as ordered and appropriate  Outcome: Progressing     Problem: Patient Centered Care  Goal: P

## 2019-11-14 NOTE — OCCUPATIONAL THERAPY NOTE
OCCUPATIONAL THERAPY EVALUATION - INPATIENT     Room Number: 449/556-N  Evaluation Date: 11/14/2019  Type of Evaluation: Initial  Presenting Problem: (s/p CABG x3)    Physician Order: IP Consult to Occupational Therapy  Reason for Therapy: ADL/IADL Dysfunc Medical History  Past Medical History:   Diagnosis Date   • Coronary atherosclerosis    • Diabetes ([facility] [address]    • High blood pressure        Past Surgical History  Past Surgical History:   Procedure Laterality Date   • HEART CORONARY ARTERY BYPASS GRAFT N/A 11 clothing?: A Little  -   Bathing (including washing, rinsing, drying)?: A Little  -   Toileting, which includes using toilet, bedpan or urinal? : A Little  -   Putting on and taking off regular upper body clothing?: None  -   Taking care of personal groomi

## 2019-11-14 NOTE — PROGRESS NOTES
Cardiology progress note  No further cp or sob.     Current Medications:  PEG 3350 (MIRALAX) powder packet 17 g, 17 g, Oral, Daily  [START ON 11/15/2019] insulin detemir (LEVEMIR) 100 UNIT/ML flextouch 25 Units, 25 Units, Subcutaneous, Daily  Insulin Aspart 100 mg, 100 mg, Oral, BID  magnesium hydroxide (MILK OF MAGNESIA) 400 MG/5ML suspension 30 mL, 30 mL, Oral, Daily PRN  ondansetron HCl (ZOFRAN) injection 4 mg, 4 mg, Intravenous, Q6H PRN  famoTIDine (PEPCID) tab 20 mg, 20 mg, Oral, Daily    Or  famoTIDine spray, 1 spray, Each Nare, Daily      amLODIPine Besylate (NORVASC) 10 MG Oral Tab, Take 10 mg by mouth daily. aspirin 81 MG Oral Tab, Take 81 mg by mouth daily. atorvastatin 40 MG Oral Tab, Take 40 mg by mouth daily.   carvedilol 12.5 MG Oral Tab, Take 1 bypass. All tubes in good position. No pulmonary congestion. Patchy right lung base atelectasis. No pneumothorax.     Dictated by (CST): Claudeen Moss, MD on 11/13/2019 at 7:04     Approved by (CST): Claudeen Moss, MD on 11/13/2019 at 7:05         no im of the aortic valve cusp with   normal excursion.                        Mild aortic regurgitation.                        No aortic stenosis.     Tricuspid Valve:   Normal tricuspid valve.                        Trace tricuspid regurgitation.     Pulmonic monitor urine output will likely need lasix tomorrow. 11/13 New ST elevation anterolaterally. Stat ekg repeated showing improvement near resolution. BP elevated. Start Imdur 30/d. Resume coreg 12.5 bid from lopressor dc.  CXR showing improved vascularity

## 2019-11-14 NOTE — PROGRESS NOTES
[facility]    Progress Note    Qamar Li Patient Status:  Inpatient    1/15/1950 MRN [id number]   Location [facility] 2W/SW Attending Deborah Auguste MD   Hosp Day # 5 PCP No primary care provider on file.        Subjective: 11/14/2019     (H) 11/14/2019    CA 8.6 11/14/2019    ALB 3.0 (L) 11/14/2019    ALKPHO 57 11/10/2019    BILT 0.4 11/10/2019    TP 7.1 11/10/2019    AST 39 (H) 11/10/2019    ALT 43 11/10/2019    PTT 41.5 (H) 11/12/2019    INR 1.81 (H) 11/12/2019    T compared with ECG of 11/12/2019 13:31:32 No significant changes have occurred Electronically signed on 11/13/2019 at 12:24 by Pedro Tolliver MD    Ekg 12-lead    Result Date: 11/12/2019  ECG Report  Interpretation  -------------------------- Sinus Rhythm - oc

## 2019-11-14 NOTE — CM/SW NOTE
11/14 Received a call from [facility] and they cannot accept patient.   Referrals sent to:     [address] [place] [address] Ethan Ignacio  [phone]    Also sending SNF referrals at patient/wife request.

## 2019-11-14 NOTE — PROGRESS NOTES
[facility] - [facility]    Progress Note    Sevtlana Arnold Patient Status:  Inpatient    1/15/1950 MRN [id number]   Location [facility] 2W/SW Attending Dann Kyle MD   Hosp Day # 5 PCP No primary care provider on file.        Subjective: ER (IMDUR) 24 hr tab 30 mg, 30 mg, Oral, Daily  •  carvedilol (COREG) tab 12.5 mg, 12.5 mg, Oral, BID with meals  •  nitroGLYCERIN (NITROSTAT) SL tab 0.4 mg, 0.4 mg, Sublingual, Q5 Min PRN  •  acetaminophen (TYLENOL) tab 650 mg, 650 mg, Oral, Q6H PRN **OR* **OR** HYDROcodone-acetaminophen (NORCO) 5-325 MG per tab 1 tablet, 1 tablet, Oral, Q4H PRN **OR** HYDROcodone-acetaminophen (NORCO) 5-325 MG per tab 2 tablet, 2 tablet, Oral, Q4H PRN  •  morphINE sulfate (PF) 2 MG/ML injection 2 mg, 2 mg, Intravenous, Q2H 14.4 14.2 14.6   NEPRELIM 5.13  --   --  14.22* 15.54*   WBC 8.3  --  16.4* 16.5* 17.9*   .0  --  115.0* 147.0* 140.0*    < > = values in this interval not displayed.          Recent Labs   Lab 11/12/19  1308 11/13/19  0438 11/14/19  0450   *

## 2019-11-14 NOTE — PROGRESS NOTES
[facility]    Progress Note      Assessment and Plan:   1. Status post coronary artery bypass grafting–doing very well clinically.     Recommendations: Hopefully chest tubes out today, aggressive antipain, anti-thrombosis and anti-atelectas

## 2019-11-14 NOTE — CM/SW NOTE
11/14: GAGAN received call from Ramandeep/RADHA, family requesting referral be sent to [facility] SNF for placement after discharge. GAGAN sent referral via [address]. DON requested.          1546 pm Addendum: GAGAN received call from Rolando, pt has been

## 2019-11-14 NOTE — PROGRESS NOTES
[facility]    Progress Note    Wyatt Wayne Patient Status:  Inpatient    1/15/1950 MRN [id number]   Location [facility] 2W/SW Attending Nerissa Skiff, MD   Hosp Day # 5 PCP No primary care provider on file.        Subjective: extremities  Psychiatric: calm  Sternum Incision dressing small proximal quarter size drainage on dressing circled.  Right and left leg incision dressings C/D/I  Right groin site dressing from IABP soft  Pulmonary:   Scattered fine rales half way up bilater now and increase meal dose to 6 units with meals. Monitor blood glucoses  Addendum; CV surgeon is ok with Plavix loading today per cardiology request for concern over possible lad thrombus with post-op EKG ST changes.   CTs have been removed  Addendum; wife bibasilar pleural effusions. Left retrocardiac atelectasis. 2. Intra-aortic balloon pump with metallic marker in the proximal descending aorta. 3. Other lines and tubes in place as described.  4. Oval-shaped nodular density in the right mid lung related to

## 2019-11-15 ENCOUNTER — APPOINTMENT (OUTPATIENT)
Dept: PICC SERVICES | Facility: HOSPITAL | Age: 69
DRG: 235 | End: 2019-11-15
Attending: CLINICAL NURSE SPECIALIST
Payer: MEDICARE

## 2019-11-15 PROCEDURE — 02HV33Z INSERTION OF INFUSION DEVICE INTO SUPERIOR VENA CAVA, PERCUTANEOUS APPROACH: ICD-10-PCS | Performed by: THORACIC SURGERY (CARDIOTHORACIC VASCULAR SURGERY)

## 2019-11-15 PROCEDURE — 99233 SBSQ HOSP IP/OBS HIGH 50: CPT | Performed by: INTERNAL MEDICINE

## 2019-11-15 PROCEDURE — 99232 SBSQ HOSP IP/OBS MODERATE 35: CPT | Performed by: HOSPITALIST

## 2019-11-15 RX ORDER — SODIUM CHLORIDE 0.9 % (FLUSH) 0.9 %
10 SYRINGE (ML) INJECTION AS NEEDED
Status: DISCONTINUED | OUTPATIENT
Start: 2019-11-15 | End: 2019-11-18

## 2019-11-15 RX ORDER — LIDOCAINE HYDROCHLORIDE 10 MG/ML
0.5 INJECTION, SOLUTION INFILTRATION; PERINEURAL ONCE AS NEEDED
Status: ACTIVE | OUTPATIENT
Start: 2019-11-15 | End: 2019-11-15

## 2019-11-15 NOTE — PLAN OF CARE
Remains on insulin drip. Solomon patent and draining clear yellow urine. Norco PRN for pain. Had one bowel movement last night. No shift events. CPM and monitoring.    Problem: Diabetes/Glucose Control  Goal: Glucose maintained within prescribed range  Brittni effectiveness of vasoactive medications to optimize hemodynamic stability  - Monitor arterial and/or venous puncture sites for bleeding and/or hematoma  - Assess quality of pulses, skin color and temperature  - Assess for signs of decreased coronary artery management of diabetes  Outcome: Progressing  Goal: Electrolytes maintained within normal limits  Description  INTERVENTIONS:  - Monitor labs and rhythm and assess patient for signs and symptoms of electrolyte imbalances  - Administer electrolyte replaceme ordered and appropriate  Outcome: Progressing     Problem: Patient Centered Care  Goal: Patient preferences are identified and integrated in the patient's plan of care  Description  Interventions:  - What would you like us to know as we care for you?  I am

## 2019-11-15 NOTE — OCCUPATIONAL THERAPY NOTE
OCCUPATIONAL THERAPY TREATMENT NOTE - INPATIENT        Room Number: 504/122-A           Presenting Problem: CABG    Problem List  Active Problems:    ACS (acute coronary syndrome) (HCC)    Atherosclerosis of native coronary artery of native heart with juice transfer training; Endurance training;Patient/Family education; Compensatory technique education    SUBJECTIVE  \"I walked 5 times yesterday\"    OBJECTIVE  Precautions: Cardiac;Sternal    WEIGHT BEARING RESTRICTION  Weight Bearing Restriction: None met;Call light within reach;RN aware of session/findings; All patient questions and concerns addressed; Family present    OT Goals:        Patient will complete functional transfer with distant supervision.    Comment: SBA    Patient will complete toileting w

## 2019-11-15 NOTE — PROGRESS NOTES
Vascular Access Note  Inserted by Celia Byers RN    Vascular Access Screening:   Allergies to Lidocaine: no  Allergies to Latex: no  Presence of Pacemaker/Defibrillator: No  Mastectomy with Lymph Node Dissection: No  AV Fistula / AV Graft: No  Dialysis Cathete

## 2019-11-15 NOTE — PAYOR COMM NOTE
- 11/13, 11/14, & 11/15    CONTINUED STAY REVIEW    Payor: MEDICARE ADVANTAGE PLAN ([facility])  Subscriber #:  [id number]  Authorization Number: [id number]    Admit date: 11/9/19  Admit time: 26    Admitting Physician: Acacia Abdul MD  Attending Phys YOAN DELGADO    11/14/2019 2112 Given 100 mg Oral Merribeadam Burnham RN      famoTIDine (PEPCID) tab 20 mg     Date Action Dose Route User    11/15/2019 0905 Given 20 mg Oral Oumou Johnson RN      Heparin Sodium (Porcine) 5000 UNIT/ML injection 5,000 Uni RN    11/14/2019 2100 Rate/Dose Change 3 Units/hr Intravenous Yris Pump, RN    11/14/2019 2000 Rate/Dose Change 4 Units/hr Intravenous Yris Pump, RN    11/14/2019 1854 Rate/Dose Change 7 Units/hr Intravenous Tyler Quiles, YOAN    11/ (ADULT) tab 1 tablet     Date Action Dose Route User    11/15/2019 0905 Given 1 tablet Oral Josie Clark RN      Vitamin C tab 500 mg     Date Action Dose Route User    11/15/2019 0904 Given 500 mg Oral Josie Clark RN    11/14/2019 211 Neurologic; awake and alert; wiggles fingers and toes nods head to questions and verbal response  Psychiatric: calm  Sternum Incision dressing C/D/I Left and right leg ace bandages C/D/I  IABP right groin site soft C/D/I  Pulmonary:  clear to auscultatio evaluate              Results:            Lab Results   Component Value Date     WBC 16.5 (H) 11/13/2019     HGB 8.9 (L) 11/13/2019     HCT 26.4 (L) 11/13/2019     .0 (L) 11/13/2019     CREATSERUM 2.51 (H) 11/13/2019     BUN 33 (H) 11/13/2019     NA with LVEF 45 %   Cardiology following   B-blocker , NTG , asa      3- CKD stage III   Slightly Worse / expected after surgery   Good UO   F/u   Renal following     4- DM type 2   Insulin      5- anemia of chronic disease   Stable      6- GI prophylaxis   p from [address] Increase imdur to 90 for better BP control.        11/12/19 s/p CABG. EKG non ischemic. CXR mild CHF image reviewed. Moving all 4 ext. On exam no rubs. CXR does show we need to move the IABP up about 2 to 3 cm, I moved it 2 cm.  CXR in am. We    Hyperkalemia   Borderline high . Should correct with Insulin      CAD s/p CABG   POD #2 , off IABP & extubated. Chest tube , Solomon still in      DM   On Insulin      HTN   BP better controlled today      D/w pt & wife May .  Spoke with RADHA Blackwell .     0 BM   Extremities: generalized edema  Pedal Pulses: doppler pulses   Skin: Skin color, texture, turgor normal. No rashes or lesions        Assessment and Plan:   S/P Urgent Cabg X3 POD #2     Encourage pulmonary toilet; IS  DVT prophylaxis; scds heparin s flat plate abd per CV surgeon  Addendum; abd flat plate results reviewed; clear liquid diet; up and ambulate; daily dulcolax supp; miralax; colace and senokot.  On reglan  Addendum: elvated blood glucoses last 374 and 357 will place back on insulin drip for NOTE  11/14: GAGAN received call from Ramandeep/RADHA, family requesting referral be sent to [address] [place] [address] for placement after discharge.      GAGAN sent referral via [facility].      DON requested.                                  1546 pm Addendum: GAGAN received c SpO2 96 %.   General: NAD  Neck: No JVD  Lungs: crackles approx 1/3 way up otherwise clear  Heart: RRR, S1, S2  Abdomen: Mildly distended and round, NT, BS+x4- slightly tympanic, +flatus, +BM x 2 (small)  Extremities: Tepid, dry, left trace LE edema/right n in hallway yesterday. Explained activity goals/expectations with patient and wife.  Discussed difference between rehab and home which would include [facility] visits (RN/PT/OT) Anticipate ready for discharge next 48-72 hours as would want kidney function improving, 25.0 25.0 23.0            Imaging:   Xr Abdomen (1 View) (cpt=74018)     Result Date: 11/14/2019  CONCLUSION: Nonspecific-nonobstructive bowel gas pattern. The ascending and transverse colon demonstrates a physiologic degree of distention.   This could rep

## 2019-11-15 NOTE — PHYSICAL THERAPY NOTE
PHYSICAL THERAPY TREATMENT NOTE - INPATIENT     Room Number: 728/844-Y       Presenting Problem: urgent CABG x 3 on 11/12    Problem List  Active Problems:    ACS (acute coronary syndrome) ([address]    Atherosclerosis of native coronary artery of native heart Monitor     BP: 148/62  BP Location: Right arm  BP Method: Automatic  Patient Position: Standing    O2 WALK  SPO2 on Room Air at Rest: 95  SPO2 Ambulation on Room Air: 95            AM-PAC '6-Clicks' INPATIENT SHORT FORM - BASIC MOBILITY  How much difficul Patient is able to demonstrate supine - sit EOB @ level: modified independent      Goal #1   Current Status  supervision - cues for sternal precautions, arm placement   Goal #2 Patient is able to demonstrate transfers Sit to/from Stand at assistance level:

## 2019-11-15 NOTE — PROGRESS NOTES
Cardiology progress note  No further cp or sob.     Current Medications:  insulin detemir (LEVEMIR) 100 UNIT/ML flextouch 20 Units, 20 Units, Subcutaneous, Daily  Insulin Aspart Pen (NOVOLOG) 100 UNIT/ML flexpen 4 Units, 4 Units, Subcutaneous, TID CC  Insul Intravenous, PRN  docusate sodium (COLACE) cap 100 mg, 100 mg, Oral, BID  magnesium hydroxide (MILK OF MAGNESIA) 400 MG/5ML suspension 30 mL, 30 mL, Oral, Daily PRN  ondansetron HCl (ZOFRAN) injection 4 mg, 4 mg, Intravenous, Q6H PRN  famoTIDine (PEPCID) t Nare, Daily      amLODIPine Besylate (NORVASC) 10 MG Oral Tab, Take 10 mg by mouth daily. aspirin 81 MG Oral Tab, Take 81 mg by mouth daily. atorvastatin 40 MG Oral Tab, Take 40 mg by mouth daily.   carvedilol 12.5 MG Oral Tab, Take 12.5 mg by mouth 2 (tw Proximal LAD:  The vessel was large sized. There was a 90 % stenosis. Mid LAD: The vessel was large sized. There was a 95 % stenosis. Distal LAD: The vessel was medium sized. Proximal circumflex: The vessel was medium sized.  Mid circumflex: The vessel  wa regurgitation.     Pericardium:       No pericardial effusion.     Aorta:             Normal aortic root diameter.     CONCLUSIONS    Mild left ventricular hypertrophy.     Mild Reduction in left ventricular ejection fraction is present.     Hypokinesis of Dr Yogi Lora. Abd x ray for distension. Bumex 1 mg x 1 given. 11/15 patient doing well, sitting in his chair, denies any CP or SOB, on DAPT, CCB, BB, nitro, and statins likely we will start ACEI before discharge if creatinine stable.     CCT 30 min  Thank

## 2019-11-15 NOTE — PROGRESS NOTES
[facility]    Progress Note    Pineda Jefferson Patient Status:  Inpatient    1/15/1950 MRN [id number]   Location [facility] 2W/SW Attending Lissett Garcia MD   Hosp Day # 6 PCP No primary care provider on file.        Subjective: Xr Abdomen (1 View) (cpt=74018)    Result Date: 11/14/2019  CONCLUSION: Nonspecific-nonobstructive bowel gas pattern. The ascending and transverse colon demonstrates a physiologic degree of distention.   This could represent normal variation or ileus

## 2019-11-15 NOTE — CDS QUERY
Clarification – Onset of Myocardial Infarction  CLINICAL DOCUMENTATION CLARIFICATION FORM    Dear Doctor:  Documentation (provided below) suggests \" Possible NSTEMI. \"  For accurate ICD-10-CM code assignment to reflect severity of illness and risk of mort PART OF THE MEDICAL RECORD

## 2019-11-15 NOTE — PROGRESS NOTES
[facility]    Progress Note    Rell Marie Patient Status:  Inpatient    1/15/1950 MRN [id number]   Location [facility] 2W/SW Attending Mercedes Monte MD   Hosp Day # 6 PCP No primary care provider on file.         Subjecti f/u      6- GI prophylaxis   pepcid      7- dvt prophylaxis   Heparin sq               Results:     Lab Results   Component Value Date    WBC 14.7 (H) 11/15/2019    HGB 8.1 (L) 11/15/2019    HCT 25.5 (L) 11/15/2019    .0 (L) 11/15/2019    CREATSERUM

## 2019-11-15 NOTE — PROGRESS NOTES
[facility]    Progress Note    Brayan Ortiz Patient Status:  Inpatient    1/15/1950 MRN [id number]   Location [facility] 2W/SW Attending Jarrod Collado MD   Hosp Day # 6 PCP No primary care provider on file.      Subjective: DIANELYS    Assessment/Plan:  S/P CABG x 3 POD # 3  Encourage pulm toilet: IS- able to perform approx 1000cc w/IS . Plan for CXR in AM.   Pain meds as needed  Increase activity- ambulates in hallway with minimal assistance.  Plan for shower tomorrow  Scds and H continues to get stronger. Discussed with them that I would anticipate him getting stronger over the next several days.                Hipolito Luna  11/15/2019  9:46 AM

## 2019-11-16 ENCOUNTER — APPOINTMENT (OUTPATIENT)
Dept: GENERAL RADIOLOGY | Facility: HOSPITAL | Age: 69
DRG: 235 | End: 2019-11-16
Attending: NURSE PRACTITIONER
Payer: MEDICARE

## 2019-11-16 PROCEDURE — 71046 X-RAY EXAM CHEST 2 VIEWS: CPT | Performed by: NURSE PRACTITIONER

## 2019-11-16 PROCEDURE — 99233 SBSQ HOSP IP/OBS HIGH 50: CPT | Performed by: INTERNAL MEDICINE

## 2019-11-16 PROCEDURE — 99232 SBSQ HOSP IP/OBS MODERATE 35: CPT | Performed by: HOSPITALIST

## 2019-11-16 NOTE — PLAN OF CARE
Problem: Diabetes/Glucose Control  Goal: Glucose maintained within prescribed range  Description  INTERVENTIONS:  - Monitor Blood Glucose as ordered  - Assess for signs and symptoms of hyperglycemia and hypoglycemia  - Administer ordered medications to m bleeding, hypotension and signs of decreased cardiac output  - Evaluate effectiveness of vasoactive medications to optimize hemodynamic stability  - Monitor arterial and/or venous puncture sites for bleeding and/or hematoma  - Assess quality of pulses, ski Achieves optimal ventilation and oxygenation  Description  INTERVENTIONS:  - Assess for changes in respiratory status  - Assess for changes in mentation and behavior  - Position to facilitate oxygenation and minimize respiratory effort  - Oxygen supplement Progressing  Goal: Hemodynamic stability and optimal renal function maintained  Description  INTERVENTIONS:  - Monitor labs and assess for signs and symptoms of volume excess or deficit  - Monitor intake, output and patient weight  - Monitor urine specific

## 2019-11-16 NOTE — PROGRESS NOTES
[facility]    Progress Note    Lucy Mendieta Patient Status:  Inpatient    1/15/1950 MRN [id number]   Location [facility] 2W/SW Attending Celia Garcia MD   Hosp Day # 7 PCP No primary care provider on file.        Subjective: Units, 5,000 Units, Subcutaneous, 2 times per day  •  atorvastatin (LIPITOR) tab 80 mg, 80 mg, Oral, Nightly  •  Isosorbide Mononitrate ER (IMDUR) 24 hr tab 30 mg, 30 mg, Oral, Daily  •  carvedilol (COREG) tab 12.5 mg, 12.5 mg, Oral, BID with meals  •  nit (BACTROBAN) 2% nasal ointment OINT 1 Application, 1 Application, Nasal, BID  •  acetaminophen (TYLENOL) tab 650 mg, 650 mg, Oral, Q4H PRN **OR** HYDROcodone-acetaminophen (NORCO) 5-325 MG per tab 1 tablet, 1 tablet, Oral, Q4H PRN **OR** HYDROcodone-acetami --    MCV 88.0 93.3 92.4  --    MCH 29.7 29.4 29.3  --    MCHC 33.7 31.6 31.8  --    RDW 14.2 14.6 14.7  --    NEPRELIM 14.22* 15.54* 11.73*  --    WBC 16.5* 17.9* 14.7*  --    .0* 140.0* 147.0*  --          Recent Labs   Lab 11/14/19  0450 11/15/19

## 2019-11-16 NOTE — PROGRESS NOTES
[facility]    Progress Note    Roberto Ramirez Patient Status:  Inpatient    1/15/1950 MRN [id number]   Location [facility] 2W/SW Attending Kamaljit Pederson MD   Hosp Day # 7 PCP No primary care provider on file.      Subjective: minimal assistance. Shower today  Remove pacing wires today  Scds and Heparin SubQ prophylaxis DVT prevention   Expected post op volume overload with wgt approx 8 kg up from pre op.  Discussed diuretics w/Nephrology who is avoiding nephrotoxic medications a

## 2019-11-16 NOTE — PLAN OF CARE
-Transitioned off insulin drip today. Solomon catheter discontinued this AM. +gas, +BMs, appetite fair. Ambulating well with PT. Denies pain. IS improving, greater than or equal to 1000. Fine rhonchi noted to lung bases.  Bilateral lower extremity edema impro output  - Evaluate effectiveness of vasoactive medications to optimize hemodynamic stability  - Monitor arterial and/or venous puncture sites for bleeding and/or hematoma  - Assess quality of pulses, skin color and temperature  - Assess for signs of decrea Instruct patient on self management of diabetes  Outcome: Progressing  Goal: Electrolytes maintained within normal limits  Description  INTERVENTIONS:  - Monitor Blood Glucose as ordered  - Assess for signs and symptoms of hyperglycemia and hypoglycemia  - integrated in the patient's plan of care  Description  Interventions:  - What would you like us to know as we care for you?  I am very family/friend oriented, please involve my loved ones in my plan of care as much as possible!   - Provide timely, complete,

## 2019-11-16 NOTE — PROGRESS NOTES
[facility]    Progress Note    Wyatt Wayne Patient Status:  Inpatient    1/15/1950 MRN [id number]   Location [facility] 2W/SW Attending Matilde Holder MD   Hosp Day # 7 PCP No primary care provider on file.        Subjective: 147.0 (L) 11/15/2019    CREATSERUM 2.46 (H) 11/16/2019    BUN 55 (H) 11/16/2019     (L) 11/16/2019    K 4.1 11/16/2019     11/16/2019    CO2 27.0 11/16/2019     (H) 11/16/2019    CA 9.1 11/16/2019    ALB 2.9 (L) 11/16/2019    ALKPHO 57 1

## 2019-11-16 NOTE — PROGRESS NOTES
Cardiology progress note  No  cp or sob. Wt is up.     Current Medications:  Insulin Aspart Pen (NOVOLOG) 100 UNIT/ML flexpen 6 Units, 6 Units, Subcutaneous, TID CC  insulin detemir (LEVEMIR) 100 UNIT/ML flextouch 20 Units, 20 Units, Subcutaneous, Daily  In mg, Oral, BID  magnesium hydroxide (MILK OF MAGNESIA) 400 MG/5ML suspension 30 mL, 30 mL, Oral, Daily PRN  ondansetron HCl (ZOFRAN) injection 4 mg, 4 mg, Intravenous, Q6H PRN  famoTIDine (PEPCID) tab 20 mg, 20 mg, Oral, Daily    Or  famoTIDine (PEPCID) inj Tab, Take 10 mg by mouth daily. aspirin 81 MG Oral Tab, Take 81 mg by mouth daily. atorvastatin 40 MG Oral Tab, Take 40 mg by mouth daily. carvedilol 12.5 MG Oral Tab, Take 12.5 mg by mouth 2 (two) times daily.  Pt takes 9am and 9 pm  Fluticasone Furoate PICC tip projects over the cavoatrial junction, in customary positioning. No pneumothorax. 3. Partial improvement in right lower lobe subsegmental atelectasis. 4. Trace pleural effusions bilaterally.     Dictated by (CST): Keyonna Ball MD on 11/16/20 Valve:      Trileaflet aortic valve.                        Diffuse thickening (sclerosis) of the aortic valve cusp with   normal excursion.                        Mild aortic regurgitation.                        No aortic stenosis.     Tricuspid Valve:   IABP out in am hopefully as no palpable pulses of le but warm feet. Other meds as bp and HR tolerates. Cr unchanged will need to monitor urine output will likely need lasix tomorrow. 11/13 New ST elevation anterolaterally.  Stat ekg repeated showing impr

## 2019-11-16 NOTE — PHYSICAL THERAPY NOTE
PHYSICAL THERAPY TREATMENT NOTE - INPATIENT     Room Number: 088/320-I       Presenting Problem: urgent CABG x 3 on 11/12    Problem List  Active Problems:    ACS (acute coronary syndrome) ([address]    Atherosclerosis of native coronary artery of native heart ASSESSMENT   Ratin          BALANCE                                                                                                                     Static Sitting: Good  Dynamic Sitting: Good           Static Standing: Good  Dynamic Standing: Good assist device: walker - rolling at assistance level: modified independent   Goal #3   Current Status Amb 300 ft w/ supervision  (amb 200 ft w/ RW & supervision + 100 ft w/o AD & supervision)     Goal #4 Patient will negotiate 3 stairs/one curb w/ assistive

## 2019-11-16 NOTE — PROGRESS NOTES
Pulmonary/Critical Care Follow Up Note    HPI:   Stef Bonner is a 78year old male with No chief complaint on file. PCP No primary care provider on file.   Admission Attending Kanchan Villanueva 15 Day #7    No CP  No sob  Low apetite    PMH: infusion 50mg in D5W 250ml, 5-300 mcg/min, Intravenous, Continuous PRN  •  norepinephrine (LEVOPHED) 4 mg/250 ml premix infusion, 0.5-30 mcg/min, Intravenous, Continuous PRN  •  Nitroprusside Sodium (NIPRIDE) 50 mg in dextrose 5 % 250 mL infusion, 0.1-4 mc injection 50 mL, 50 mL, Intravenous, PRN  •  [MAR Hold] Glucose-Vitamin C (DEX-4) chewable tab 4 tablet, 4 tablet, Oral, Q15 Min PRN  •  [MAR Hold] glucose (DEX4) oral liquid 15 g, 15 g, Oral, Q15 Min PRN  •  amLODIPine Besylate (NORVASC) tab 10 mg, 10 mg, down  Renal following  Plan cont to follow     4- DM type 2   Insulin      5- anemia of chronic disease   Stable  Plan follow     6- GI prophylaxis   pepcid      7- dvt prophylaxis   Heparin sq     Will sign off  Please call with any questions or concerns

## 2019-11-17 PROCEDURE — 99232 SBSQ HOSP IP/OBS MODERATE 35: CPT | Performed by: HOSPITALIST

## 2019-11-17 RX ORDER — ISOSORBIDE MONONITRATE 60 MG/1
60 TABLET, EXTENDED RELEASE ORAL DAILY
Status: DISCONTINUED | OUTPATIENT
Start: 2019-11-18 | End: 2019-11-18

## 2019-11-17 NOTE — PROGRESS NOTES
[facility]    Progress Note    Vero Vaughan Patient Status:  Inpatient    1/15/1950 MRN [id number]   Location [facility] 2W/SW Attending Jesus Mccullough MD   Hosp Day # 8 PCP No primary care provider on file.        Subjective: per day  •  atorvastatin (LIPITOR) tab 80 mg, 80 mg, Oral, Nightly  •  Isosorbide Mononitrate ER (IMDUR) 24 hr tab 30 mg, 30 mg, Oral, Daily  •  carvedilol (COREG) tab 12.5 mg, 12.5 mg, Oral, BID with meals  •  nitroGLYCERIN (NITROSTAT) SL tab 0.4 mg, 0.4 PRN **OR** HYDROcodone-acetaminophen (NORCO) 5-325 MG per tab 1 tablet, 1 tablet, Oral, Q4H PRN **OR** HYDROcodone-acetaminophen (NORCO) 5-325 MG per tab 2 tablet, 2 tablet, Oral, Q4H PRN  •  morphINE sulfate (PF) 2 MG/ML injection 2 mg, 2 mg, Intravenous, --  14.4   NEPRELIM 15.54* 11.73*  --  11.15*   WBC 17.9* 14.7*  --  15.5*   .0* 147.0*  --  192.0         Recent Labs   Lab 11/15/19  0443 11/16/19  0415 11/17/19  0453   * 125* 101*   BUN 55* 55* 57*   CREATSERUM 2.73* 2.46* 2.40*   GFRAA 2

## 2019-11-17 NOTE — PLAN OF CARE
Problem: Diabetes/Glucose Control  Goal: Glucose maintained within prescribed range  Description  INTERVENTIONS:  - Monitor Blood Glucose as ordered  - Assess for signs and symptoms of hyperglycemia and hypoglycemia  - Administer ordered medications to m indicated  - Evaluate effectiveness of antiarrhythmic and heart rate control medications as ordered  - Initiate emergency measures for life threatening arrhythmias  - Monitor electrolytes and administer replacement therapy as ordered  Outcome: Progressing

## 2019-11-17 NOTE — PROGRESS NOTES
[facility]    Progress Note    Chance Massed Patient Status:  Inpatient    1/15/1950 MRN [id number]   Location [facility] 2W/SW Attending Carmen Kline MD   Hosp Day # 8 PCP No primary care provider on file.        Subjective: 3.050 11/12/2019    MG 3.1 (H) 11/17/2019    PHOS 2.4 (L) 11/17/2019       Xr Chest Pa + Lat Chest (cpt=71046)    Result Date: 11/16/2019  CONCLUSION:  1. Postoperative changes of a recent CABG.   Endotracheal tube, enteric tube, [place] catheter and IABP

## 2019-11-17 NOTE — PROGRESS NOTES
S/P CABG x 3 POD # 5  Dr ramires saw at bedside    Encourage pulm toilet: IS- able to perform approx 1000cc w/IS. Pain meds as needed  Increase activity- ambulates in hallway with minimal assistance.    Scds and Heparin SubQ prophylaxis DVT prevention   Expec

## 2019-11-17 NOTE — PROGRESS NOTES
Cardiology progress note  No  cp or sob. Hgb downtrending.     Current Medications:  Insulin Aspart Pen (NOVOLOG) 100 UNIT/ML flexpen 15 Units, 15 Units, Subcutaneous, TID CC  Insulin Aspart Pen (NOVOLOG) 100 UNIT/ML flexpen 1-5 Units, 1-5 Units, Subcutaneo MAGNESIA) 400 MG/5ML suspension 30 mL, 30 mL, Oral, Daily PRN  ondansetron HCl (ZOFRAN) injection 4 mg, 4 mg, Intravenous, Q6H PRN  famoTIDine (PEPCID) tab 20 mg, 20 mg, Oral, Daily    Or  famoTIDine (PEPCID) injection 20 mg, 20 mg, Intravenous, Daily  pot daily.  carvedilol 12.5 MG Oral Tab, Take 12.5 mg by mouth 2 (two) times daily. Pt takes 9am and 9 pm  Fluticasone Furoate 27.5 MCG/SPRAY Nasal Suspension, 1 spray by Nasal route daily.   glipiZIDE 10 MG Oral Tab, Take 10 mg by mouth every morning before br LAD:  The vessel was large sized. There was a 90 % stenosis. Mid LAD: The vessel was large sized. There was a 95 % stenosis. Distal LAD: The vessel was medium sized. Proximal circumflex: The vessel was medium sized.  Mid circumflex: The vessel  was medium Pericardium:       No pericardial effusion.     Aorta:             Normal aortic root diameter.     CONCLUSIONS    Mild left ventricular hypertrophy.     Mild Reduction in left ventricular ejection fraction is present.     Hypokinesis of anterolateral wall further CP, resolved after SL NTG given yesterday. Plavix 300 mg po loading dose today, dw Dr Erica Perla. Abd x ray for distension. Bumex 1 mg x 1 given.     11/15 patient doing well, sitting in his chair, denies any CP or SOB, on DAPT, CCB, BB, nitro, and sta

## 2019-11-18 VITALS
RESPIRATION RATE: 25 BRPM | DIASTOLIC BLOOD PRESSURE: 63 MMHG | OXYGEN SATURATION: 99 % | TEMPERATURE: 98 F | WEIGHT: 193.69 LBS | HEIGHT: 70 IN | BODY MASS INDEX: 27.73 KG/M2 | HEART RATE: 71 BPM | SYSTOLIC BLOOD PRESSURE: 134 MMHG

## 2019-11-18 PROCEDURE — 99239 HOSP IP/OBS DSCHRG MGMT >30: CPT | Performed by: HOSPITALIST

## 2019-11-18 RX ORDER — ISOSORBIDE MONONITRATE 60 MG/1
60 TABLET, EXTENDED RELEASE ORAL DAILY
Qty: 30 TABLET | Refills: 0 | Status: SHIPPED | OUTPATIENT
Start: 2019-11-19

## 2019-11-18 RX ORDER — CLOPIDOGREL BISULFATE 75 MG/1
75 TABLET ORAL DAILY
Qty: 90 TABLET | Refills: 0 | Status: SHIPPED | OUTPATIENT
Start: 2019-11-19

## 2019-11-18 RX ORDER — DOXEPIN HYDROCHLORIDE 50 MG/1
1 CAPSULE ORAL DAILY
Qty: 30 TABLET | Refills: 0 | Status: SHIPPED | OUTPATIENT
Start: 2019-11-19

## 2019-11-18 RX ORDER — AMIODARONE HYDROCHLORIDE 200 MG/1
200 TABLET ORAL 2 TIMES DAILY
Qty: 60 TABLET | Refills: 0 | Status: SHIPPED | OUTPATIENT
Start: 2019-11-18

## 2019-11-18 RX ORDER — HYDROCODONE BITARTRATE AND ACETAMINOPHEN 5; 325 MG/1; MG/1
2 TABLET ORAL EVERY 4 HOURS PRN
Qty: 30 TABLET | Refills: 0 | Status: SHIPPED | OUTPATIENT
Start: 2019-11-18

## 2019-11-18 RX ORDER — AMIODARONE HYDROCHLORIDE 200 MG/1
200 TABLET ORAL
Qty: 90 TABLET | Refills: 0 | Status: SHIPPED | OUTPATIENT
Start: 2019-11-18 | End: 2019-11-18

## 2019-11-18 RX ORDER — ASCORBIC ACID 500 MG
500 TABLET ORAL 3 TIMES DAILY
Qty: 90 TABLET | Refills: 0 | Status: SHIPPED | OUTPATIENT
Start: 2019-11-18

## 2019-11-18 RX ORDER — FAMOTIDINE 20 MG/1
20 TABLET ORAL DAILY
Qty: 30 TABLET | Refills: 0 | Status: SHIPPED | OUTPATIENT
Start: 2019-11-19

## 2019-11-18 NOTE — CM/SW NOTE
Discharge Disposition:    Patient discharged to home today. No accepting [facility] agency available. [facility] is the only one in network for patient's insurance and they are unable to accept. Ibis Love, Dr. Сергей Sen and Dr. Severo Matas notified by CM.  Dr. Severo Matas

## 2019-11-18 NOTE — PROGRESS NOTES
[facility] - [facility]    Progress Note    Richi Velasco Patient Status:  Inpatient    1/15/1950 MRN [id number]   Location [facility] 2W/SW Attending Nicho Lanier MD   Hosp Day # 9 PCP No primary care provider on file.      Subjective: ambulates in hallway with minimal assistance. Scds and Heparin SubQ prophylaxis DVT prevention   Expected post op volume overload. No diuretics planned per Nephrology: avoiding nephrotoxic medications at this time.   Hx: CKD 4 pre op with continued mgt per

## 2019-11-18 NOTE — PROGRESS NOTES
[facility]    Progress Note    Lucy Mendieta Patient Status:  Inpatient    1/15/1950 MRN [id number]   Location [facility] 2W/SW Attending Celia Garcia MD   Hosp Day # 9 PCP No primary care provider on file.        Subjective: TSH 3.050 11/12/2019    MG 3.1 (H) 11/17/2019    PHOS 2.1 (L) 11/18/2019                   Radha Godwin MD  11/18/2019

## 2019-11-18 NOTE — DIABETES ED
[facility] - [facility]    Diabetes Education  Note    Asa Curb Patient Status:  Inpatient   1/15/1950 MRN [id number]  Location [facility] 2W/SW Attending Iwona Menezes MD  Hosp Day # 9 PCP No primary care provider on file.     Reason

## 2019-11-18 NOTE — PROGRESS NOTES
Cardiology progress note  No  cp or sob.  Hgb better     Current Medications:  Insulin Aspart Pen (NOVOLOG) 100 UNIT/ML flexpen 7 Units, 7 Units, Subcutaneous, TID CC  insulin detemir (LEVEMIR) 100 UNIT/ML flextouch 10 Units, 10 Units, Subcutaneous, Daily MG/5ML suspension 30 mL, 30 mL, Oral, Daily PRN  ondansetron HCl (ZOFRAN) injection 4 mg, 4 mg, Intravenous, Q6H PRN  famoTIDine (PEPCID) tab 20 mg, 20 mg, Oral, Daily    Or  famoTIDine (PEPCID) injection 20 mg, 20 mg, Intravenous, Daily  potassium chlorid daily.  carvedilol 12.5 MG Oral Tab, Take 12.5 mg by mouth 2 (two) times daily. Pt takes 9am and 9 pm  Fluticasone Furoate 27.5 MCG/SPRAY Nasal Suspension, 1 spray by Nasal route daily.   glipiZIDE 10 MG Oral Tab, Take 10 mg by mouth every morning before br Ventricle:    Normal left ventricular size.                        Mild left ventricular hypertrophy.                        Mild Reduction in left ventricular ejection fraction is   present.                        Hypokinesis of anterolateral wall, bret ischemia  Impression/Recommendations:       Unstable angina with possible NSTEMI, I ll review his records from [facility] in AM  - CAD s/p CABG LIMA to LAD, Seq SVG to ramus and OM, RCA non bypassable     CKD4    DM2    Acute systolic chf, ef 76% previously, Nephrology note reviewed. 11/18 Hg stable, patient has mild LE edema, being discharged today, patient has LE edema , nephro managing fluid balance. Thank you for allowing me to participate in the care of your patient.  please call if you have any qu

## 2019-11-18 NOTE — CARDIAC REHAB
Cardiac Rehab Phase I    Activity:   Chair: Yes   Ambulation: Yes   Assistive Device: No   Distance: 200 feet   Assistance needed: No   Patient tolerated activity: Well. Shower Date:  Tolerated Shower Activity .     Education:  Handouts provided and revie

## 2019-11-18 NOTE — DISCHARGE SUMMARY
[facility]    Discharge Summary    Vero Vaughan Patient Status:  Inpatient    1/15/1950 MRN [id number]   Location [facility] 2W/SW Attending Jesus Mccullough MD   Hosp Day # 9 PCP No primary care provider on file.      Date of which showed severe triple-vessel disease needing CABG. Patient was transferred to [facility] because of insurance purposes.     Patient currently denies chest pain palpitations shortness of breath PND orthopnea.     Hospital Course:   CAD  S/p urgent tablet  Refills:  0        CONTINUE taking these medications      Instructions Prescription details   aspirin 81 MG Tabs      Take 81 mg by mouth daily. Refills:  0     atorvastatin 40 MG Tabs  Commonly known as:  LIPITOR      Take 40 mg by mouth daily. with patient in well over half time in face-to-face discussion of further evaluation and therapy. Melissa Dominguez.  Eugenio Marie  11/18/2019

## 2019-11-18 NOTE — PLAN OF CARE
Problem: Diabetes/Glucose Control  Goal: Glucose maintained within prescribed range  Description  INTERVENTIONS:  - Monitor Blood Glucose as ordered  - Assess for signs and symptoms of hyperglycemia and hypoglycemia  - Administer ordered medications to m coronary artery perfusion - ex.  Angina  - Evaluate fluid balance, assess for edema, trend weights  Outcome: Progressing  Goal: Absence of cardiac arrhythmias or at baseline  Description  INTERVENTIONS:  - Continuous cardiac monitoring, monitor vital signs,

## 2019-11-19 NOTE — CM/SW NOTE
Updated Discharge Plan:  [facility] and will contact patient tonight for visit tomorrow. Dr. Meryl Reynolds office and patient and wife notified. Phone number for [facility] provided to wife.      José Miguel Guadalupe MBA MSN, RN CTL/Case Manage

## 2019-11-19 NOTE — CM/SW NOTE
Additional [place] [address] referrals have been sent to the following. Waiting on acceptance.       [address] [facility] [phone]  [facility]. [phone]  [facility]. [phone]  [address] [place] [address] [phone]  [facility]

## 2019-11-20 NOTE — PAYOR COMM NOTE
--------------  DISCHARGE REVIEW    Payor: MEDICARE ADVANTAGE [facility]  Subscriber #:  [id number]  Authorization Number: [id number]    Admit date: 11/9/19  Admit time:  6218  Discharge Date: 11/18/2019 12:49 PM     Admitting Physician: Luz Payton    NECK:  Supple.  There was no JVD.    CHEST:  Symmetrical movement on inspiration  CARDIAC: S1 S2+, RRR  LUNGS: CTAB with decreased BS at bases  ABDOMEN: Non-distended, non-tender, BS+   EXTREMITIES: There was no edema  NEUROLOGIC: Cranial nerves II-XII known as:  PLAVIX      Take 1 tablet (75 mg total) by mouth daily. Quantity:  90 tablet  Refills:  0     famoTIDine 20 MG Tabs  Commonly known as:  PEPCID      Take 1 tablet (20 mg total) by mouth daily.    Quantity:  30 tablet  Refills:  0     HYDROcodon [address] [facility] [address] [facility],[address], [address]   Riley Mejias,   On 12/4/2019  @9:45AM  [address]   [address]  [facility] [address]  [phone]   Bhavna Wallace MD  On 11/27/2019  @ 11:45am   [address] [facility]

## 2019-11-23 ENCOUNTER — LAB ENCOUNTER (OUTPATIENT)
Dept: LAB | Facility: HOSPITAL | Age: 69
End: 2019-11-23
Attending: INTERNAL MEDICINE
Payer: MEDICARE

## 2019-11-23 DIAGNOSIS — N18.9 ANEMIA OF CHRONIC RENAL FAILURE, UNSPECIFIED CKD STAGE: ICD-10-CM

## 2019-11-23 DIAGNOSIS — D63.1 ANEMIA OF CHRONIC RENAL FAILURE, UNSPECIFIED CKD STAGE: ICD-10-CM

## 2019-11-23 LAB
ALBUMIN SERPL-MCNC: 3.5 G/DL (ref 3.4–5)
ANION GAP SERPL CALC-SCNC: 10 MMOL/L (ref 0–18)
BUN BLD-MCNC: 26 MG/DL (ref 7–18)
BUN/CREAT SERPL: 12 (ref 10–20)
CALCIUM BLD-MCNC: 10 MG/DL (ref 8.5–10.1)
CHLORIDE SERPL-SCNC: 106 MMOL/L (ref 98–112)
CO2 SERPL-SCNC: 18 MMOL/L (ref 21–32)
CREAT BLD-MCNC: 2.17 MG/DL (ref 0.7–1.3)
GLUCOSE BLD-MCNC: 174 MG/DL (ref 70–99)
HCT VFR BLD AUTO: 32.7 % (ref 39–53)
HGB BLD-MCNC: 10 G/DL (ref 13–17.5)
OSMOLALITY SERPL CALC.SUM OF ELEC: 287 MOSM/KG (ref 275–295)
PHOSPHATE SERPL-MCNC: 2.4 MG/DL (ref 2.5–4.9)
POTASSIUM SERPL-SCNC: 5.5 MMOL/L (ref 3.5–5.1)
SODIUM SERPL-SCNC: 134 MMOL/L (ref 136–145)

## 2019-11-23 PROCEDURE — 85018 HEMOGLOBIN: CPT

## 2019-11-23 PROCEDURE — 36415 COLL VENOUS BLD VENIPUNCTURE: CPT

## 2019-11-23 PROCEDURE — 85014 HEMATOCRIT: CPT

## 2019-11-23 PROCEDURE — 80069 RENAL FUNCTION PANEL: CPT

## 2019-12-09 ENCOUNTER — ORDER TRANSCRIPTION (OUTPATIENT)
Dept: CARDIAC REHAB | Facility: HOSPITAL | Age: 69
End: 2019-12-09

## 2019-12-09 DIAGNOSIS — Z95.1 S/P CABG (CORONARY ARTERY BYPASS GRAFT): Primary | ICD-10-CM

## 2019-12-11 ENCOUNTER — CARDPULM VISIT (OUTPATIENT)
Dept: CARDIAC REHAB | Facility: HOSPITAL | Age: 69
End: 2019-12-11
Attending: INTERNAL MEDICINE
Payer: MEDICARE

## 2019-12-11 DIAGNOSIS — Z95.1 S/P CABG (CORONARY ARTERY BYPASS GRAFT): ICD-10-CM

## 2023-02-22 NOTE — PROGRESS NOTES
[facility]    Progress Note    Lloyd Larry Patient Status:  Inpatient    1/15/1950 MRN [id number]   Location [facility] 2W/SW Attending Skye Palacios MD   Hosp Day # 6 PCP No primary care provider on file.        Subjective: mg, Oral, BID with meals  •  nitroGLYCERIN (NITROSTAT) SL tab 0.4 mg, 0.4 mg, Sublingual, Q5 Min PRN  •  acetaminophen (TYLENOL) tab 650 mg, 650 mg, Oral, Q6H PRN **OR** acetaminophen (TYLENOL) 160 MG/5ML oral liquid 650 mg, 650 mg, Oral, Q6H PRN **OR** ac PRN **OR** HYDROcodone-acetaminophen (NORCO) 5-325 MG per tab 2 tablet, 2 tablet, Oral, Q4H PRN  •  morphINE sulfate (PF) 2 MG/ML injection 2 mg, 2 mg, Intravenous, Q2H PRN **OR** morphINE sulfate (PF) 4 MG/ML injection 4 mg, 4 mg, Intravenous, Q2H PRN **O CREATSERUM 2.51* 2.55* 2.73*   GFRAA 29* 28* 26*   GFRNAA 25* 24* 23*   CA 8.2* 8.6 9.2    138 134*   K 4.3 5.2* 4.7    107 105   CO2 25.0 25.0 23.0         Imaging:   Xr Abdomen (1 View) (cpt=74018)    Result Date: 11/14/2019  CONCLUSION: No [House] : [unfilled] lives in a house  [Radiator/Baseboard] : heating provided by radiator(s)/baseboard(s) [Window Units] : air conditioning provided by window units [Dehumidifier] : uses a dehumidifier [Damp/Musty] : damp/musty [Other___] : [unfilled] [Smokers in Household] : there are smokers in the home [Mother] : mother [Father] : father [Brother] : brother [Sister] : sister [Grade:  _____] : Grade: [unfilled] [FreeTextEntry1] : 3rd grade\par lives with mom,dad,brother,sister [Humidifier] : does not use a humidifier [Dust Mite Covers] : does not have dust mite covers [Feather Pillows] : does not have feather pillows [Feather Comforter] : does not have a feather comforter [Bedroom] : not in the bedroom [Basement] : not in the basement [Living Area] : not in the living area [de-identified] : in basement [de-identified] : area rug in living area [de-identified] : soccer,lacrosse [de-identified] : father

## (undated) DEVICE — TRAY ENDOVEIN KTV16 HARVESTING

## (undated) DEVICE — SUTURE VICRYL 1-0 J977H

## (undated) DEVICE — [HIGH FLOW INSUFFLATOR,  DO NOT USE IF PACKAGE IS DAMAGED,  KEEP DRY,  KEEP AWAY FROM SUNLIGHT,  PROTECT FROM HEAT AND RADIOACTIVE SOURCES.]: Brand: PNEUMOSURE

## (undated) DEVICE — FORESIGHT LARGE SENSOR: Brand: FORESIGHT

## (undated) DEVICE — SUTURE PROLENE 7-0 8701H

## (undated) DEVICE — SUTURE PROLENE 3-0 SH

## (undated) DEVICE — GAMMEX® PI HYBRID SIZE 8, STERILE POWDER-FREE SURGICAL GLOVE, POLYISOPRENE AND NEOPRENE BLEND: Brand: GAMMEX

## (undated) DEVICE — SUTURE PDS II 2-0 CT-1

## (undated) DEVICE — ADAPTER CRDPLG ANTGRD RTRGD 3W

## (undated) DEVICE — SUTURE PDS II 1 CTX

## (undated) DEVICE — PRESSURE TUBING: Brand: TRUWAVE

## (undated) DEVICE — SUTURE SILK 2-0 SA85H

## (undated) DEVICE — SUTURE PROLENE 4-0 BB

## (undated) DEVICE — 12 FOOT DISPOSABLE EXTENSION CABLE WITH SAFE CONNECT / SCREW-DOWN

## (undated) DEVICE — SUTURE PROLENE 7-0 BV175-6

## (undated) DEVICE — SUTURE ETHIBOND 0 CT-1

## (undated) DEVICE — PUNCH 3MM LNG HNDL AOR

## (undated) DEVICE — SUTURE SURGICAL STEEL #7

## (undated) DEVICE — ALARM PT PTCH LVL

## (undated) DEVICE — SUTURE SILK 1-0 SA87G

## (undated) DEVICE — ABSORBABLE HEMOSTAT (OXIDIZED REGENERATED CELLULOSE, U.S.P.): Brand: SURGICEL

## (undated) DEVICE — SPONGE LAP 18X18 XRAY STRL

## (undated) DEVICE — SUTURE PROLENE 7-0 BV-1

## (undated) DEVICE — SUTURE PROLENE 6-0 C-1

## (undated) DEVICE — 12 ML SYRINGE LUER-LOCK TIP: Brand: MONOJECT

## (undated) DEVICE — GOWN SURG AERO BLUE PERF XLG

## (undated) DEVICE — FLOSEAL HEMOSTATIC MATRIX, 5ML: Brand: FLOSEAL HEMOSTATIC MATRIX

## (undated) DEVICE — SUTURE SILK 4-0 SA63H

## (undated) DEVICE — CENTRAL VENOUS CATHETER SET: Brand: COOK

## (undated) DEVICE — LEAD BIPOLAR TEMP 6495XF53

## (undated) DEVICE — INDICATED FOR SURGICAL CLAMPING DURING CARDIOVASCULAR PERIPHERAL VASCULAR, AND GENERAL SURGERY.: Brand: SOFT/FIBRA® SPRING CLIP

## (undated) DEVICE — CANNULA PRFSN 15IN 32/40FR .5

## (undated) DEVICE — OCCLUDER VASC 12MM 1.25MM

## (undated) DEVICE — SUTURE SILK 0 FSL

## (undated) DEVICE — PAD PLMM SLVR 12.5X4IN SLVR

## (undated) DEVICE — 3M™ BAIR HUGGER® UNDERBODY BLANKET, FULL ACCESS, 10 PER CASE 63500: Brand: BAIR HUGGER™

## (undated) DEVICE — Device

## (undated) DEVICE — STERILE (10.2 X 147CM) TELESCOPICALLY-FOLDED COVER: Brand: CIV-FLEX™ TRANSDUCER COVER

## (undated) DEVICE — HEART DRAPE & SUPPLY PACK: Brand: MEDLINE INDUSTRIES, INC.

## (undated) DEVICE — HEART A: Brand: MEDLINE INDUSTRIES, INC.

## (undated) DEVICE — CLIP SM INTNL HRZN TI LGT LTWT

## (undated) DEVICE — SUCTION CANISTER, 3000CC,SAFELINER: Brand: DEROYAL

## (undated) DEVICE — CARD SMRT MEDISTEM VERIQ TRNST

## (undated) DEVICE — PACK ASSRY CUSTOM TUBING

## (undated) DEVICE — CLIP SM W INTNL HRZN TI TPE LF

## (undated) DEVICE — PUNCH AORTIC 4.0 LONG HANDLE

## (undated) DEVICE — SOL  .9 1000ML BAG

## (undated) DEVICE — DEVICE BLWR/MSTR ACCUMIST ATCH

## (undated) DEVICE — SUTURE PROLENE 5-0 C-1

## (undated) DEVICE — INSERT SUTURE OPEN HEART

## (undated) DEVICE — STABILIZER SRG UNV AST CABG

## (undated) DEVICE — RETROGRADE CARDIOPLEGIA CATHETER: Brand: EDWARDS LIFESCIENCES RETROGRADE CARDIOPLEGIA CATHETER

## (undated) DEVICE — STERILE TETRA-FLEX CF, ELASTIC BANDAGE, 4" X 5.5YD: Brand: TETRA-FLEX™CF

## (undated) DEVICE — SUTURE PROLENE 6-0 8807H

## (undated) DEVICE — SUTURE MONOCRYL 3-0 Y936H

## (undated) DEVICE — CS5/5+ FASTPACK, 225ML 150U RES: Brand: HAEMONETICS CELL SAVER 5/5+ SYSTEMS

## (undated) DEVICE — SOL  .9 1000ML BTL

## (undated) DEVICE — DRAPE SLUSH/WARMER W/DISC

## (undated) DEVICE — CLIP LG INTNL HMCLP TNTLM ESCP

## (undated) DEVICE — CONNECTOR PRFSN QCK PRM .25IN

## (undated) DEVICE — THORACIC CATHETER, RIGHT ANGLE, SILICONE, WITH CLOTSTOP®: Brand: AXIOM® ATRAUM™ WITH CLOTSTOP®

## (undated) DEVICE — PACK CUSTOM TUBING

## (undated) DEVICE — BATTERY

## (undated) DEVICE — CARTRIDGE HC HMS+ CRTDG SYR

## (undated) DEVICE — EZ GLIDE AORTIC CANNULA: Brand: EDWARDS LIFESCIENCES EZ GLIDE AORTIC CANNULA

## (undated) DEVICE — STERILE TETRA-FLEX CF, ELASTIC BANDAGE LATEX FREE 6IN X5.5 YD: Brand: TETRA-FLEX™CF

## (undated) DEVICE — SUTURE WIRE DOUBLE STERNOTOMY

## (undated) DEVICE — THORACIC CATHETER, STRAIGHT, SILICONE, WITH CLOTSTOP®: Brand: AXIOM® ATRAUM™ WITH CLOTSTOP®

## (undated) DEVICE — SUTURE SILK 2-0 SH

## (undated) DEVICE — Device: Brand: MEDEX

## (undated) DEVICE — VESSEL OCCULDER FLORESTER 1.0

## (undated) DEVICE — CANNULA PRFSN 2.63IN 3MM 2MM

## (undated) DEVICE — CATH SECURING DEVICE STATLOCK

## (undated) NOTE — LETTER
[facility]  Administration of Anesthesia  1. I, Lucy Mendieta, or _________________________________ acting on his behalf, (Patient) (Dependent/Representative) request to receive anesthesia for my pending procedure/operation/treatment.   A ph infections, high spinal block, spinal bleeding, seizure, cardiac arrest and death. 7. AWARENESS: I understand that it is possible (but unlikely) to have explicit memory of events from the operating room while under general anesthesia.   8. ELECTROCONVULSIV unconscious pt /Relationship    My signature below affirms that prior to the time of the procedure, I have explained to the patient and/or his/her guardian, the risks and benefits of undergoing anesthesia, as well as any reasonable alternatives.     _______

## (undated) NOTE — LETTER
[address], [place], [place]     AUTHORIZATION FOR SURGICAL OPERATION OR PROCEDURE    I hereby authorize Dr. Miles Singh MD, my Physician(s) and whomever may be designated as the doctor's Assistant, to perform the 4. I consent to the photographing of procedure(s) to be performed for the purposes of advancing medicine, science and/or education, provided my identity is not revealed.  If the procedure has been videotaped, the physician/surgeon will obtain the original v (Witness signature)                                                                                                  (Date)                                (Time)  STATEMENT OF PHYSICIAN My signature below affirms that prior to the time of the procedure;  I

## (undated) NOTE — LETTER
[address] [facility], [facility] [address]     I agree to have a Peripherally Inserted Central Catheter (PICC) placed in my arm.      1. The PICC insertion procedure, care, maintenance, risks, benefits, and complications Statement of Physician: My signature below affirms that prior to the time of the PICC line insertion, I have explained to the patient and/or his/her legal representative, the risks and benefits involved in the proposed treatment and any reasonable alternat

## (undated) NOTE — LETTER
[address] [facility], [facility] 121     I agree to have a Peripherally Inserted Central Catheter (PICC) placed in my arm.      1. The PICC insertion procedure, care, maintenance, risks, benefits, and complications Statement of Physician: My signature below affirms that prior to the time of the PICC line insertion, I have explained to the patient and/or his/her legal representative, the risks and benefits involved in the proposed treatment and any reasonable alternat

## (undated) NOTE — LETTER
[address] [facility], [facility] 121     I agree to have a Peripherally Inserted Central Catheter (PICC) placed in my arm.      1. The PICC insertion procedure, care, maintenance, risks, benefits, and complications Statement of Physician: My signature below affirms that prior to the time of the PICC line insertion, I have explained to the patient and/or his/her legal representative, the risks and benefits involved in the proposed treatment and any reasonable alternat

## (undated) NOTE — LETTER
Yaquelin Lagunas [address]  [facility] [place], [place], [place]  [id number]  INFORMED CONSENT FOR TRANSFUSION OF BLOOD OR BLOOD PRODUCTS  My physician has informed me of the nature, purpose, benefits and risks of transfusion for blood and blood components that ______________________________________________  (Signature of Patient)                                                            (Responsible party in case of Minor,